# Patient Record
Sex: MALE | Race: WHITE | URBAN - METROPOLITAN AREA
[De-identification: names, ages, dates, MRNs, and addresses within clinical notes are randomized per-mention and may not be internally consistent; named-entity substitution may affect disease eponyms.]

---

## 2017-01-04 ENCOUNTER — AMBUL SURGICAL CARE (OUTPATIENT)
Dept: URBAN - METROPOLITAN AREA SURGERY 32 | Facility: SURGERY | Age: 72
Setting detail: OPHTHALMOLOGY
End: 2017-01-04
Payer: COMMERCIAL

## 2017-01-04 DIAGNOSIS — H25.041: ICD-10-CM

## 2017-01-04 DIAGNOSIS — H25.11: ICD-10-CM

## 2017-01-04 PROCEDURE — 66984 XCAPSL CTRC RMVL W/O ECP: CPT | Performed by: OPHTHALMOLOGY

## 2017-01-04 PROCEDURE — G8918 PT W/O PREOP ORDER IV AB PRO: HCPCS | Performed by: OPHTHALMOLOGY

## 2017-01-04 PROCEDURE — G8907 PT DOC NO EVENTS ON DISCHARG: HCPCS | Performed by: OPHTHALMOLOGY

## 2017-01-05 ENCOUNTER — RX ONLY (RX ONLY)
Age: 72
End: 2017-01-05

## 2017-01-05 ENCOUNTER — DOCTOR'S OFFICE (OUTPATIENT)
Dept: URBAN - METROPOLITAN AREA CLINIC 136 | Facility: CLINIC | Age: 72
Setting detail: OPHTHALMOLOGY
End: 2017-01-05
Payer: COMMERCIAL

## 2017-01-05 DIAGNOSIS — H25.013: ICD-10-CM

## 2017-01-05 PROCEDURE — 99024 POSTOP FOLLOW-UP VISIT: CPT | Performed by: OPHTHALMOLOGY

## 2017-01-05 ASSESSMENT — REFRACTION_MANIFEST
OU_VA: 20/
OU_VA: 20/
OS_VA3: 20/
OD_VA1: 20/
OS_VA3: 20/
OS_VA3: 20/
OU_VA: 20/
OS_VA1: 20/
OD_VA1: 20/
OS_VA1: 20/
OD_VA1: 20/
OD_VA2: 20/
OS_VA2: 20/
OD_VA3: 20/
OD_VA3: 20/
OS_VA2: 20/
OS_VA2: 20/
OS_VA1: 20/
OD_VA3: 20/
OD_VA2: 20/
OD_VA2: 20/

## 2017-01-05 ASSESSMENT — REFRACTION_CURRENTRX
OS_VPRISM_DIRECTION: PROGS
OD_OVR_VA: 20/
OS_OVR_VA: 20/
OS_SPHERE: -10.00
OD_AXIS: 068
OD_OVR_VA: 20/
OS_AXIS: 137
OS_OVR_VA: 20/
OD_VPRISM_DIRECTION: PROGS
OS_OVR_VA: 20/
OD_ADD: +2.50
OS_CYLINDER: -1.50
OD_OVR_VA: 20/
OD_SPHERE: -8.00
OS_ADD: +2.50
OD_CYLINDER: -0.50

## 2017-01-05 ASSESSMENT — REFRACTION_AUTOREFRACTION
OD_CYLINDER: -0.50
OS_AXIS: 017
OS_CYLINDER: -2.50
OS_SPHERE: -10.00
OD_AXIS: 118
OD_SPHERE: -0.50

## 2017-01-05 ASSESSMENT — SPHEQUIV_DERIVED
OD_SPHEQUIV: -0.75
OS_SPHEQUIV: -11.25

## 2017-01-05 ASSESSMENT — VISUAL ACUITY
OS_BCVA: 20/25+1
OD_BCVA: 20/30-2

## 2017-01-05 ASSESSMENT — KERATOMETRY
OD_AXISANGLE_DEGREES: 178
OD_K2POWER_DIOPTERS: 45.25
OS_K1POWER_DIOPTERS: 44.50
OS_K2POWER_DIOPTERS: 45.25
OS_AXISANGLE_DEGREES: 046
OD_K1POWER_DIOPTERS: 44.25

## 2017-01-05 ASSESSMENT — AXIALLENGTH_DERIVED
OS_AL: 28.2328
OD_AL: 23.4261

## 2017-01-11 ENCOUNTER — AMBUL SURGICAL CARE (OUTPATIENT)
Dept: URBAN - METROPOLITAN AREA SURGERY 32 | Facility: SURGERY | Age: 72
Setting detail: OPHTHALMOLOGY
End: 2017-01-11
Payer: COMMERCIAL

## 2017-01-11 ENCOUNTER — DOCTOR'S OFFICE (OUTPATIENT)
Dept: URBAN - METROPOLITAN AREA CLINIC 136 | Facility: CLINIC | Age: 72
Setting detail: OPHTHALMOLOGY
End: 2017-01-11
Payer: COMMERCIAL

## 2017-01-11 DIAGNOSIS — H25.12: ICD-10-CM

## 2017-01-11 DIAGNOSIS — H25.042: ICD-10-CM

## 2017-01-11 PROCEDURE — 92136 OPHTHALMIC BIOMETRY: CPT | Performed by: OPHTHALMOLOGY

## 2017-01-11 PROCEDURE — G8907 PT DOC NO EVENTS ON DISCHARG: HCPCS | Performed by: OPHTHALMOLOGY

## 2017-01-11 PROCEDURE — 66984 XCAPSL CTRC RMVL W/O ECP: CPT | Performed by: OPHTHALMOLOGY

## 2017-01-11 PROCEDURE — G8918 PT W/O PREOP ORDER IV AB PRO: HCPCS | Performed by: OPHTHALMOLOGY

## 2017-01-12 ENCOUNTER — DOCTOR'S OFFICE (OUTPATIENT)
Dept: URBAN - METROPOLITAN AREA CLINIC 136 | Facility: CLINIC | Age: 72
Setting detail: OPHTHALMOLOGY
End: 2017-01-12

## 2017-01-12 DIAGNOSIS — Z96.1: ICD-10-CM

## 2017-01-12 PROBLEM — H25.013 CATARACT; BOTH EYES: Status: RESOLVED | Noted: 2017-01-12 | Resolved: 2017-01-12

## 2017-01-12 PROCEDURE — 99024 POSTOP FOLLOW-UP VISIT: CPT | Performed by: OPTOMETRIST

## 2017-01-12 ASSESSMENT — CONFRONTATIONAL VISUAL FIELD TEST (CVF)
OS_FINDINGS: FULL
OD_FINDINGS: FULL

## 2017-01-12 ASSESSMENT — AXIALLENGTH_DERIVED
OD_AL: 23.5226
OS_AL: 23.4289

## 2017-01-12 ASSESSMENT — REFRACTION_MANIFEST
OD_VA2: 20/
OS_VA3: 20/
OS_VA3: 20/
OU_VA: 20/
OS_VA2: 20/
OU_VA: 20/
OS_VA1: 20/
OS_VA1: 20/
OD_VA3: 20/
OS_VA1: 20/
OD_VA3: 20/
OS_VA2: 20/
OD_VA2: 20/
OD_VA1: 20/
OD_VA2: 20/
OS_VA3: 20/
OU_VA: 20/
OD_VA1: 20/
OS_VA2: 20/
OD_VA1: 20/
OD_VA3: 20/

## 2017-01-12 ASSESSMENT — REFRACTION_CURRENTRX
OS_SPHERE: -10.00
OD_VPRISM_DIRECTION: PROGS
OD_AXIS: 068
OD_ADD: +2.50
OD_SPHERE: -8.00
OS_ADD: +2.50
OD_OVR_VA: 20/
OS_CYLINDER: -1.50
OS_AXIS: 137
OS_VPRISM_DIRECTION: PROGS
OS_OVR_VA: 20/
OS_OVR_VA: 20/
OD_CYLINDER: -0.50
OD_OVR_VA: 20/
OD_OVR_VA: 20/
OS_OVR_VA: 20/

## 2017-01-12 ASSESSMENT — SPHEQUIV_DERIVED
OS_SPHEQUIV: -0.875
OD_SPHEQUIV: -1

## 2017-01-12 ASSESSMENT — KERATOMETRY
OS_AXISANGLE_DEGREES: 046
OS_K2POWER_DIOPTERS: 45.25
OS_K1POWER_DIOPTERS: 44.50
OD_K1POWER_DIOPTERS: 44.25
OD_AXISANGLE_DEGREES: 178
OD_K2POWER_DIOPTERS: 45.25

## 2017-01-12 ASSESSMENT — REFRACTION_AUTOREFRACTION
OS_AXIS: 035
OD_CYLINDER: -1.00
OS_CYLINDER: -1.25
OD_SPHERE: -0.50
OS_SPHERE: -0.25
OD_AXIS: 132

## 2017-01-12 ASSESSMENT — VISUAL ACUITY
OD_BCVA: 20/30-2
OS_BCVA: 20/30+2

## 2017-01-19 ENCOUNTER — RX ONLY (RX ONLY)
Age: 72
End: 2017-01-19

## 2017-01-19 ENCOUNTER — DOCTOR'S OFFICE (OUTPATIENT)
Dept: URBAN - METROPOLITAN AREA CLINIC 136 | Facility: CLINIC | Age: 72
Setting detail: OPHTHALMOLOGY
End: 2017-01-19

## 2017-01-19 DIAGNOSIS — Z96.1: ICD-10-CM

## 2017-01-19 PROCEDURE — 99024 POSTOP FOLLOW-UP VISIT: CPT | Performed by: OPTOMETRIST

## 2017-01-19 ASSESSMENT — VISUAL ACUITY
OD_BCVA: 20/25-2
OS_BCVA: 20/20-1

## 2017-01-19 ASSESSMENT — REFRACTION_MANIFEST
OS_VA1: 20/
OD_VA3: 20/
OD_VA3: 20/
OS_VA2: 20/
OD_VA2: 20/
OU_VA: 20/
OS_VA2: 20/
OS_VA1: 20/
OD_VA1: 20/
OS_VA3: 20/
OD_VA3: 20/
OS_VA3: 20/
OD_VA1: 20/
OU_VA: 20/
OD_VA2: 20/
OU_VA: 20/
OD_VA1: 20/
OD_VA2: 20/
OS_VA1: 20/
OS_VA3: 20/
OS_VA2: 20/

## 2017-01-19 ASSESSMENT — REFRACTION_AUTOREFRACTION
OS_CYLINDER: -1.25
OD_CYLINDER: -1.00
OD_AXIS: 132
OS_AXIS: 035
OD_SPHERE: -0.50
OS_SPHERE: -0.25

## 2017-01-19 ASSESSMENT — REFRACTION_CURRENTRX
OD_SPHERE: -8.00
OD_VPRISM_DIRECTION: PROGS
OS_AXIS: 137
OS_OVR_VA: 20/
OS_CYLINDER: -1.50
OD_OVR_VA: 20/
OD_OVR_VA: 20/
OD_AXIS: 068
OD_ADD: +2.50
OD_CYLINDER: -0.50
OS_ADD: +2.50
OS_OVR_VA: 20/
OD_OVR_VA: 20/
OS_VPRISM_DIRECTION: PROGS
OS_SPHERE: -10.00
OS_OVR_VA: 20/

## 2017-01-19 ASSESSMENT — SPHEQUIV_DERIVED
OD_SPHEQUIV: -1
OS_SPHEQUIV: -0.875

## 2017-02-09 ENCOUNTER — DOCTOR'S OFFICE (OUTPATIENT)
Dept: URBAN - METROPOLITAN AREA CLINIC 136 | Facility: CLINIC | Age: 72
Setting detail: OPHTHALMOLOGY
End: 2017-02-09

## 2017-02-09 DIAGNOSIS — Z96.1: ICD-10-CM

## 2017-02-09 DIAGNOSIS — H52.4: ICD-10-CM

## 2017-02-09 PROCEDURE — 99024 POSTOP FOLLOW-UP VISIT: CPT | Performed by: OPTOMETRIST

## 2017-02-09 PROCEDURE — 92015 DETERMINE REFRACTIVE STATE: CPT | Performed by: OPTOMETRIST

## 2017-02-09 ASSESSMENT — REFRACTION_CURRENTRX
OS_OVR_VA: 20/
OD_CYLINDER: -0.50
OD_AXIS: 068
OS_SPHERE: +2.00
OS_AXIS: 137
OS_ADD: +2.50
OD_OVR_VA: 20/
OD_ADD: +2.50
OD_SPHERE: +2.00
OD_OVR_VA: 20/
OS_OVR_VA: 20/
OD_OVR_VA: 20/
OS_VPRISM_DIRECTION: PROGS
OD_SPHERE: -8.00
OS_SPHERE: -10.00
OS_OVR_VA: 20/
OD_VPRISM_DIRECTION: PROGS
OS_CYLINDER: -1.50

## 2017-02-09 ASSESSMENT — REFRACTION_AUTOREFRACTION
OD_AXIS: 132
OS_CYLINDER: -1.25
OS_AXIS: 035
OD_SPHERE: -0.50
OS_SPHERE: -0.25
OD_CYLINDER: -1.00

## 2017-02-09 ASSESSMENT — REFRACTION_OUTSIDERX
OS_VA3: 20/
OS_AXIS: 070
OD_VA1: 20/20
OS_SPHERE: -0.50
OU_VA: 20/20
OD_CYLINDER: -0.25
OD_VA2: 20/20
OD_ADD: +2.50
OD_AXIS: 015
OD_SPHERE: -0.50
OS_ADD: +2.50
OD_VA3: 20/
OS_CYLINDER: -0.50
OS_VA2: 20/
OS_VA1: 20/20

## 2017-02-09 ASSESSMENT — REFRACTION_MANIFEST
OD_VA3: 20/
OD_VA2: 20/
OS_VA1: 20/
OD_VA1: 20/
OD_VA2: 20/
OS_VA3: 20/
OD_VA3: 20/
OD_VA1: 20/
OS_VA3: 20/
OU_VA: 20/
OU_VA: 20/
OS_VA2: 20/
OS_VA1: 20/
OS_VA2: 20/

## 2017-02-09 ASSESSMENT — SPHEQUIV_DERIVED
OS_SPHEQUIV: -0.875
OD_SPHEQUIV: -1

## 2017-02-09 ASSESSMENT — VISUAL ACUITY
OS_BCVA: 20/20-1
OD_BCVA: 20/25-2

## 2017-02-20 ENCOUNTER — OPTICAL OFFICE (OUTPATIENT)
Dept: URBAN - METROPOLITAN AREA CLINIC 143 | Facility: CLINIC | Age: 72
Setting detail: OPHTHALMOLOGY
End: 2017-02-20
Payer: COMMERCIAL

## 2017-02-20 DIAGNOSIS — Z96.1: ICD-10-CM

## 2017-02-20 PROCEDURE — V2750 ANTI-REFLECTIVE COATING: HCPCS | Performed by: OPTOMETRIST

## 2017-02-20 PROCEDURE — V2103 SPHEROCYLINDR 4.00D/12-2.00D: HCPCS | Performed by: OPTOMETRIST

## 2017-02-20 PROCEDURE — V2020 VISION SVCS FRAMES PURCHASES: HCPCS | Performed by: OPTOMETRIST

## 2017-12-01 ENCOUNTER — DOCTOR'S OFFICE (OUTPATIENT)
Dept: URBAN - METROPOLITAN AREA CLINIC 136 | Facility: CLINIC | Age: 72
Setting detail: OPHTHALMOLOGY
End: 2017-12-01
Payer: COMMERCIAL

## 2017-12-01 DIAGNOSIS — H43.812: ICD-10-CM

## 2017-12-01 DIAGNOSIS — Z96.1: ICD-10-CM

## 2017-12-01 DIAGNOSIS — H02.825: ICD-10-CM

## 2017-12-01 PROCEDURE — 92014 COMPRE OPH EXAM EST PT 1/>: CPT | Performed by: OPTOMETRIST

## 2017-12-01 ASSESSMENT — REFRACTION_CURRENTRX
OD_OVR_VA: 20/
OS_AXIS: 137
OS_VPRISM_DIRECTION: PROGS
OD_VPRISM_DIRECTION: PROGS
OD_OVR_VA: 20/
OS_OVR_VA: 20/
OS_ADD: +2.50
OS_OVR_VA: 20/
OS_OVR_VA: 20/
OD_CYLINDER: -0.50
OS_SPHERE: -10.00
OS_CYLINDER: -1.50
OD_ADD: +2.50
OD_AXIS: 068
OD_SPHERE: +2.00
OD_OVR_VA: 20/
OS_SPHERE: +2.00
OD_SPHERE: -8.00

## 2017-12-01 ASSESSMENT — REFRACTION_OUTSIDERX
OS_SPHERE: -0.50
OD_AXIS: 015
OD_ADD: +2.50
OS_ADD: +2.50
OS_VA3: 20/
OS_CYLINDER: -0.50
OD_VA1: 20/20
OD_VA3: 20/
OD_SPHERE: -0.50
OU_VA: 20/20
OS_AXIS: 070
OD_VA2: 20/20
OD_CYLINDER: -0.25
OS_VA1: 20/20
OS_VA2: 20/

## 2017-12-01 ASSESSMENT — REFRACTION_AUTOREFRACTION
OS_SPHERE: -0.25
OS_AXIS: 035
OD_CYLINDER: -1.00
OD_SPHERE: -0.50
OD_AXIS: 132
OS_CYLINDER: -1.25

## 2017-12-01 ASSESSMENT — REFRACTION_MANIFEST
OD_VA2: 20/
OD_VA3: 20/
OD_VA3: 20/
OS_VA1: 20/
OD_VA2: 20/
OU_VA: 20/
OS_VA1: 20/
OS_VA3: 20/
OD_VA1: 20/
OS_VA2: 20/
OS_VA2: 20/
OD_VA1: 20/
OU_VA: 20/
OS_VA3: 20/

## 2017-12-01 ASSESSMENT — VISUAL ACUITY
OD_BCVA: 20/25
OS_BCVA: 20/25-2

## 2017-12-01 ASSESSMENT — SPHEQUIV_DERIVED
OD_SPHEQUIV: -1
OS_SPHEQUIV: -0.875

## 2017-12-01 ASSESSMENT — CONFRONTATIONAL VISUAL FIELD TEST (CVF)
OD_FINDINGS: FULL
OS_FINDINGS: FULL

## 2018-12-03 ENCOUNTER — DOCTOR'S OFFICE (OUTPATIENT)
Dept: URBAN - METROPOLITAN AREA CLINIC 136 | Facility: CLINIC | Age: 73
Setting detail: OPHTHALMOLOGY
End: 2018-12-03
Payer: COMMERCIAL

## 2018-12-03 DIAGNOSIS — H52.223: ICD-10-CM

## 2018-12-03 DIAGNOSIS — H43.812: ICD-10-CM

## 2018-12-03 DIAGNOSIS — H52.13: ICD-10-CM

## 2018-12-03 DIAGNOSIS — H52.4: ICD-10-CM

## 2018-12-03 DIAGNOSIS — Z96.1: ICD-10-CM

## 2018-12-03 DIAGNOSIS — H02.821: ICD-10-CM

## 2018-12-03 PROCEDURE — 92014 COMPRE OPH EXAM EST PT 1/>: CPT | Performed by: OPTOMETRIST

## 2018-12-03 ASSESSMENT — REFRACTION_MANIFEST
OS_VA2: 20/20
OD_VA3: 20/
OS_SPHERE: -0.50
OS_VA1: 20/20
OS_VA3: 20/
OS_CYLINDER: -0.50
OD_VA2: 20/20
OD_SPHERE: -0.50
OU_VA: 20/
OD_VA1: 20/20
OS_VA3: 20/
OD_VA1: 20/
OD_ADD: +2.50
OD_VA2: 20/
OD_CYLINDER: SPH
OS_ADD: +2.50
OS_VA2: 20/
OU_VA: 20/20
OS_VA1: 20/
OS_AXIS: 070
OD_VA3: 20/

## 2018-12-03 ASSESSMENT — KERATOMETRY
OS_K1POWER_DIOPTERS: 44.50
OS_AXISANGLE_DEGREES: 046
OS_K2POWER_DIOPTERS: 45.25
OD_K1POWER_DIOPTERS: 44.25
OD_AXISANGLE_DEGREES: 178
OD_K2POWER_DIOPTERS: 45.25

## 2018-12-03 ASSESSMENT — REFRACTION_AUTOREFRACTION
OD_SPHERE: -0.25
OS_SPHERE: -0.25
OS_AXIS: 035
OD_CYLINDER: -1.75
OD_AXIS: 029
OS_CYLINDER: -1.25

## 2018-12-03 ASSESSMENT — REFRACTION_CURRENTRX
OD_SPHERE: -8.00
OD_OVR_VA: 20/
OS_SPHERE: +2.00
OS_SPHERE: +2.50
OS_ADD: +2.50
OS_VPRISM_DIRECTION: PROGS
OS_OVR_VA: 20/
OD_VPRISM_DIRECTION: PROGS
OS_CYLINDER: -1.50
OS_OVR_VA: 20/
OD_OVR_VA: 20/
OS_SPHERE: -10.00
OD_AXIS: 068
OD_OVR_VA: 20/
OS_OVR_VA: 20/
OD_ADD: +2.50
OS_AXIS: 137
OD_CYLINDER: -0.50
OD_SPHERE: +2.00
OD_SPHERE: +2.50

## 2018-12-03 ASSESSMENT — CONFRONTATIONAL VISUAL FIELD TEST (CVF)
OS_FINDINGS: FULL
OD_FINDINGS: FULL

## 2018-12-03 ASSESSMENT — AXIALLENGTH_DERIVED
OS_AL: 23.4289
OD_AL: 23.5711
OS_AL: 23.381

## 2018-12-03 ASSESSMENT — SPHEQUIV_DERIVED
OD_SPHEQUIV: -1.125
OS_SPHEQUIV: -0.875
OS_SPHEQUIV: -0.75

## 2018-12-03 ASSESSMENT — LID EXAM ASSESSMENTS
OS_BLEPHARITIS: LLL LUL 2+
OD_BLEPHARITIS: RLL RUL 2+

## 2018-12-03 ASSESSMENT — VISUAL ACUITY
OS_BCVA: 20/25+2
OD_BCVA: 20/25-1

## 2020-03-17 ENCOUNTER — TELEPHONE (OUTPATIENT)
Dept: UROLOGY | Facility: MEDICAL CENTER | Age: 75
End: 2020-03-17

## 2020-03-17 NOTE — TELEPHONE ENCOUNTER
Called pt to aline appt with Dr Gen Coffey in [facility] from 3/19/2020  Left message for pt to call back and aline  Told pt he could  be seen by Thu Klein or Shreya Aceves here in [facility]  Pt lives in [place]

## 2020-06-04 ENCOUNTER — TELEMEDICINE (OUTPATIENT)
Dept: UROLOGY | Facility: MEDICAL CENTER | Age: 75
End: 2020-06-04
Payer: COMMERCIAL

## 2020-06-04 DIAGNOSIS — N13.8 BPH WITH OBSTRUCTION/LOWER URINARY TRACT SYMPTOMS: Primary | ICD-10-CM

## 2020-06-04 DIAGNOSIS — R35.1 NOCTURIA: ICD-10-CM

## 2020-06-04 DIAGNOSIS — N40.1 BPH WITH OBSTRUCTION/LOWER URINARY TRACT SYMPTOMS: Primary | ICD-10-CM

## 2020-06-04 PROCEDURE — 99213 OFFICE O/P EST LOW 20 MIN: CPT | Performed by: UROLOGY

## 2020-07-09 ENCOUNTER — TELEPHONE (OUTPATIENT)
Dept: UROLOGY | Facility: MEDICAL CENTER | Age: 75
End: 2020-07-09

## 2020-07-09 NOTE — TELEPHONE ENCOUNTER
Patient is coming in with Bj Rodas for [address] 34 w/u  There was a note from last office visit with Dr Johnson stating he wanted a renal US done prior  Please advise wether this should still be done and if so we would need it ordered in his chart to schedule

## 2020-07-10 DIAGNOSIS — N40.1 BPH WITH OBSTRUCTION/LOWER URINARY TRACT SYMPTOMS: Primary | ICD-10-CM

## 2020-07-10 DIAGNOSIS — N13.8 BPH WITH OBSTRUCTION/LOWER URINARY TRACT SYMPTOMS: Primary | ICD-10-CM

## 2020-07-10 NOTE — TELEPHONE ENCOUNTER
Call placed to patient who is aware of recommendations of Dr Asya Duran at this time and will contact [facility] in regards to scheduling this appointment

## 2020-07-14 ENCOUNTER — HOSPITAL ENCOUNTER (OUTPATIENT)
Dept: ULTRASOUND IMAGING | Facility: HOSPITAL | Age: 75
Discharge: HOME | End: 2020-07-14
Attending: UROLOGY
Payer: COMMERCIAL

## 2020-07-14 DIAGNOSIS — N40.1 BPH WITH OBSTRUCTION/LOWER URINARY TRACT SYMPTOMS: ICD-10-CM

## 2020-07-14 DIAGNOSIS — N13.8 BPH WITH OBSTRUCTION/LOWER URINARY TRACT SYMPTOMS: ICD-10-CM

## 2020-07-14 PROCEDURE — 76770 US EXAM ABDO BACK WALL COMP: CPT

## 2020-07-14 RX ORDER — VENLAFAXINE HYDROCHLORIDE 150 MG/1
150 CAPSULE, EXTENDED RELEASE ORAL DAILY
COMMUNITY
Start: 2020-05-30

## 2020-07-14 RX ORDER — LOSARTAN POTASSIUM 100 MG/1
TABLET ORAL
COMMUNITY
Start: 2020-06-29

## 2020-07-14 RX ORDER — TAMSULOSIN HYDROCHLORIDE 0.4 MG/1
CAPSULE ORAL
COMMUNITY
Start: 2020-05-18 | End: 2020-09-24

## 2020-07-16 ENCOUNTER — PROCEDURE VISIT (OUTPATIENT)
Dept: UROLOGY | Facility: MEDICAL CENTER | Age: 75
End: 2020-07-16
Payer: COMMERCIAL

## 2020-07-16 ENCOUNTER — TELEPHONE (OUTPATIENT)
Dept: UROLOGY | Facility: MEDICAL CENTER | Age: 75
End: 2020-07-16

## 2020-07-16 VITALS — TEMPERATURE: 97.3 F | WEIGHT: 120 LBS | BODY MASS INDEX: 17.77 KG/M2 | HEIGHT: 69 IN

## 2020-07-16 DIAGNOSIS — N13.8 BPH WITH OBSTRUCTION/LOWER URINARY TRACT SYMPTOMS: Primary | ICD-10-CM

## 2020-07-16 DIAGNOSIS — N52.1 ERECTILE DYSFUNCTION DUE TO DISEASES CLASSIFIED ELSEWHERE: ICD-10-CM

## 2020-07-16 DIAGNOSIS — N40.1 BPH WITH OBSTRUCTION/LOWER URINARY TRACT SYMPTOMS: Primary | ICD-10-CM

## 2020-07-16 LAB
SL AMB  POCT GLUCOSE, UA: NORMAL
SL AMB LEUKOCYTE ESTERASE,UA: NORMAL
SL AMB POCT BILIRUBIN,UA: NORMAL
SL AMB POCT BLOOD,UA: NORMAL
SL AMB POCT CLARITY,UA: CLEAR
SL AMB POCT COLOR,UA: YELLOW
SL AMB POCT KETONES,UA: NORMAL
SL AMB POCT NITRITE,UA: NORMAL
SL AMB POCT PH,UA: 5.5
SL AMB POCT SPECIFIC GRAVITY,UA: 1.02
SL AMB POCT URINE PROTEIN: NORMAL
SL AMB POCT UROBILINOGEN: 0.2

## 2020-07-16 PROCEDURE — 81003 URINALYSIS AUTO W/O SCOPE: CPT | Performed by: UROLOGY

## 2020-07-16 PROCEDURE — 99214 OFFICE O/P EST MOD 30 MIN: CPT | Performed by: UROLOGY

## 2020-07-16 PROCEDURE — 76872 US TRANSRECTAL: CPT | Performed by: UROLOGY

## 2020-07-16 PROCEDURE — 51741 ELECTRO-UROFLOWMETRY FIRST: CPT | Performed by: UROLOGY

## 2020-07-16 PROCEDURE — 52000 CYSTOURETHROSCOPY: CPT | Performed by: UROLOGY

## 2020-07-16 RX ORDER — SULFAMETHOXAZOLE AND TRIMETHOPRIM 800; 160 MG/1; MG/1
1 TABLET ORAL EVERY 12 HOURS SCHEDULED
Qty: 4 TABLET | Refills: 0 | Status: SHIPPED | OUTPATIENT
Start: 2020-07-16 | End: 2020-07-16

## 2020-07-16 RX ORDER — SULFAMETHOXAZOLE AND TRIMETHOPRIM 800; 160 MG/1; MG/1
1 TABLET ORAL EVERY 12 HOURS SCHEDULED
Qty: 4 TABLET | Refills: 0 | Status: SHIPPED | OUTPATIENT
Start: 2020-07-16 | End: 2020-07-18

## 2020-07-16 NOTE — LETTER
July 16, 2020     Ritchie Hannah, [address]    Patient: Robert Nova   YOB: 1945   Date of Visit: 7/16/2020       Dear Dr Meghann New:    Thank you for referring Robert Pate to me for evaluation  Below are my notes for this consultation  If you have questions, please do not hesitate to call me  I look forward to following your patient along with you  Sincerely,        Jameel Torres MD        CC: No Recipients  Jameel Torres MD  7/16/2020  9:33 AM  Sign at close encounter  Uroflow  Date/Time: 7/16/2020 9:14 AM  Performed by: Jameel Torres MD  Authorized by: Jameel Torres MD   Procedure - Urodynamics:  Procedure details: Simple Uroflow          Post-procedure:     Patient tolerance: Patient tolerated procedure well with no immediate complications              Jameel Torres MD  7/16/2020  9:36 AM  Sign at close encounter  Cystoscopy  Date/Time: 7/16/2020 9:14 AM  Performed by: Jameel Torres MD  Authorized by: Jameel Torres MD     Procedure details: cystoscopy    Patient tolerance: Patient tolerated the procedure well with no immediate complications    Additional Procedure Details: The patient was placed supine on the examining table and after prepping the urethral meatus with Betadine 2% lidocaine lubricant was inserted in the urethral lumen and left indwelling for 5 minutes  Flexible cystourethroscopy took place thereafter revealing a normal anterior urethra  There was significant bilobar enlargement of the lateral lobes of the prostate with visual occlusion of the bladder outlet  4-6 Uro lift implants would be appropriate for this prostate  There was not significant evidence of a prostatic median lobe  The urinary bladder was severely trabeculated with posterior wall cellules and diverticular formation bilaterally    Ureteral orifices were normal position and configuration bilaterally with clear efflux bilaterally  There were no intrinsic lesions of the bladder no evidence of extrinsic mass compression of the bladde  The bladder was left full and the patient was asked to void into the uroflow  Refer to the dictation for uroflow and PVR  The patient return to the cystoscopy suite after performing uroflow  He was then placed in the right flank up position and the transrectal condom covered lubricated ultrasound probe was placed per anus into the rectal vault  Sagittal and longitudinal imaging of the prostate and seminal vesicles took place revealing normal seminal vesicles bilaterally  No hypoechoic peripheral zone lesions were identified  There was some peripheral zone heterogeneity without focal lesion  Periurethral calcifications were present  There was evidence of bilateral adenoma  Transverse diameter the prostate was measured at    mm with prostatic volume estimated at     Cc  At the end of the procedure the probe was removed atraumatically and the patient recovered uneventfully and left the office in stable condition

## 2020-07-16 NOTE — PROGRESS NOTES
Cystoscopy  Date/Time: 7/16/2020 9:14 AM  Performed by: Salome Galloway MD  Authorized by: Salome Galloway MD     Procedure details: cystoscopy    Patient tolerance: Patient tolerated the procedure well with no immediate complications    Additional Procedure Details: The patient was placed supine on the examining table and after prepping the urethral meatus with Betadine 2% lidocaine lubricant was inserted in the urethral lumen and left indwelling for 5 minutes  Flexible cystourethroscopy took place thereafter revealing a normal anterior urethra  There was significant bilobar enlargement of the lateral lobes of the prostate with visual occlusion of the bladder outlet  4-6 Uro lift implants would be appropriate for this prostate  There was not significant evidence of a prostatic median lobe  The urinary bladder was severely trabeculated with posterior wall cellules and diverticular formation bilaterally  Ureteral orifices were normal position and configuration bilaterally with clear efflux bilaterally  There were no intrinsic lesions of the bladder no evidence of extrinsic mass compression of the bladde  The bladder was left full and the patient was asked to void into the uroflow  Refer to the dictation for uroflow and PVR  The patient return to the cystoscopy suite after performing uroflow  He was then placed in the right flank up position and the transrectal condom covered lubricated ultrasound probe was placed per anus into the rectal vault  Sagittal and longitudinal imaging of the prostate and seminal vesicles took place revealing normal seminal vesicles bilaterally  No hypoechoic peripheral zone lesions were identified  There was some peripheral zone heterogeneity without focal lesion  Periurethral calcifications were present  There was evidence of bilateral adenoma  Transverse diameter the prostate was measured at    48mm with prostatic volume estimated at 37    Cc    At the end of the procedure the probe was removed atraumatically and the patient recovered uneventfully and left the office in stable condition

## 2020-07-16 NOTE — H&P (VIEW-ONLY)
Cystoscopy  Date/Time: 7/16/2020 9:14 AM  Performed by: Kayla Orozco MD  Authorized by: Kayla Orozco MD     Procedure details: cystoscopy    Patient tolerance: Patient tolerated the procedure well with no immediate complications    Additional Procedure Details: The patient was placed supine on the examining table and after prepping the urethral meatus with Betadine 2% lidocaine lubricant was inserted in the urethral lumen and left indwelling for 5 minutes  Flexible cystourethroscopy took place thereafter revealing a normal anterior urethra  There was significant bilobar enlargement of the lateral lobes of the prostate with visual occlusion of the bladder outlet  4-6 Uro lift implants would be appropriate for this prostate  There was not significant evidence of a prostatic median lobe  The urinary bladder was severely trabeculated with posterior wall cellules and diverticular formation bilaterally  Ureteral orifices were normal position and configuration bilaterally with clear efflux bilaterally  There were no intrinsic lesions of the bladder no evidence of extrinsic mass compression of the bladde  The bladder was left full and the patient was asked to void into the uroflow  Refer to the dictation for uroflow and PVR  The patient return to the cystoscopy suite after performing uroflow  He was then placed in the right flank up position and the transrectal condom covered lubricated ultrasound probe was placed per anus into the rectal vault  Sagittal and longitudinal imaging of the prostate and seminal vesicles took place revealing normal seminal vesicles bilaterally  No hypoechoic peripheral zone lesions were identified  There was some peripheral zone heterogeneity without focal lesion  Periurethral calcifications were present  There was evidence of bilateral adenoma  Transverse diameter the prostate was measured at    48mm with prostatic volume estimated at 37    Cc    At the end of the procedure the probe was removed atraumatically and the patient recovered uneventfully and left the office in stable condition

## 2020-07-16 NOTE — TELEPHONE ENCOUNTER
I spoke to the patient to schedule his Urolift with Dr Teresa Vincent 8/7/2020 at [facility]      -instructions given verbally and mailed  -CBC, CMP, CPVID, Urine C&S and EKG 10 days prior  -[facility] -

## 2020-07-16 NOTE — PROGRESS NOTES
H&P Exam - Urology   Robert Pate 66 y o  male MRN: [id number]  Unit/Bed#:  Encounter: [id number]    Assessment/Plan     Assessment:  BPH with lower near E tract symptoms  Erectile dysfunction  Plan:  Cystoscopy insertion of Uro lift implants    History of Present Illness   HPI:  August Holli Severance is a 66 y o  male who presents with an AUA symptom score of 25  Specifically with 5/5 weakening of the urinary stream, 4/5 for frequency intermittency straining to urinate with 4 times per night nocturia and 3/5 incomplete bladder emptying  The patient had this symptom complex after being placed on tamsulosin 0 4 mg p o  daily and the patient notes no positive affect from pharmacologic therapy  The patient had a uroflow with a average flow rate of approximately 1 cc/second with a maximum flow of only 3 2 cc/second  Prostate size was 37 cc with a transverse diameter of 48 mm  The patient does not wish to continue alpha blockade if he does not have to  He discussed TURP as well as Uro lift and laser TURP in detail and he is aware the potential for bleeding infection urinary retention urinary incontinence urgency frequency or exacerbation of irritative or obstructive symptoms with Uro lift  He understands that other procedures may need to be performed  The patient however wishes to proceed with Uro lift hoping that this will improve his stream   On cystoscopy he had bilobar enlargement of the lateral lobes of the prostate with visual occlusion to the bladder outlet and a moderately to severely trabeculated diverticular laid urinary bladder  Patient agrees to [employer] lift       Review of Systems   Genitourinary:        See HPI an AUA symptom score   All other systems reviewed and are negative  Historical Information   History reviewed  No pertinent past medical history  History reviewed  No pertinent surgical history    Social History   Social History     Substance and Sexual Activity   Alcohol Use Not on file     Social History     Substance and Sexual Activity   Drug Use Not on file     Social History     Tobacco Use   Smoking Status Not on file     Family History: History reviewed  No pertinent family history  Meds/Allergies   all medications and allergies reviewed  Allergies   Allergen Reactions    Lisinopril Cough       Objective   Vitals: Height 5' 9" (1 753 m), weight 54 4 kg (120 lb)  [unfilled]    Invasive Devices     None                 Physical Exam   Constitutional: He is oriented to person, place, and time  He appears well-developed and well-nourished  No distress  HENT:   Head: Normocephalic and atraumatic  Eyes: Pupils are equal, round, and reactive to light  EOM are normal    Neck: Normal range of motion  Neck supple  Cardiovascular: Normal rate, regular rhythm and normal heart sounds  Pulmonary/Chest: Effort normal and breath sounds normal  No respiratory distress  Abdominal: Soft  Bowel sounds are normal  He exhibits no distension  Genitourinary: Penis normal    Genitourinary Comments: MOMO normal anal verge normal anal sphincter tone no palpable rectal masses prostate approximately 35 g a nodular nontender   Musculoskeletal: Normal range of motion  Neurological: He is alert and oriented to person, place, and time  Skin: He is not diaphoretic  Psychiatric: He has a normal mood and affect  His behavior is normal  Judgment and thought content normal    Vitals reviewed  Lab Results: I have personally reviewed pertinent reports  Imaging: I have personally reviewed pertinent reports  EKG, Pathology, and Other Studies: I have personally reviewed pertinent reports  VTE Prophylaxis: Sequential compression device Juve Bagley)     Code Status: [unfilled]  Advance Directive and Living Will:      Power of :    POLST:      Counseling / Coordination of Care  Total floor / unit time spent today 45 minutes    Greater than 50% of total time was spent with the patient and / or family counseling and / or coordination of care  A description of the counseling / coordination of care: Keturah Ortega

## 2020-07-16 NOTE — H&P (VIEW-ONLY)
H&P Exam - Urology   Robert Pate 66 y o  male MRN: [id number]  Unit/Bed#:  Encounter: [id number]    Assessment/Plan     Assessment:  BPH with lower near E tract symptoms  Erectile dysfunction  Plan:  Cystoscopy insertion of Uro lift implants    History of Present Illness   HPI:  Robert Prather is a 66 y o  male who presents with an AUA symptom score of 25  Specifically with 5/5 weakening of the urinary stream, 4/5 for frequency intermittency straining to urinate with 4 times per night nocturia and 3/5 incomplete bladder emptying  The patient had this symptom complex after being placed on tamsulosin 0 4 mg p o  daily and the patient notes no positive affect from pharmacologic therapy  The patient had a uroflow with a average flow rate of approximately 1 cc/second with a maximum flow of only 3 2 cc/second  Prostate size was 37 cc with a transverse diameter of 48 mm  The patient does not wish to continue alpha blockade if he does not have to  He discussed TURP as well as Uro lift and laser TURP in detail and he is aware the potential for bleeding infection urinary retention urinary incontinence urgency frequency or exacerbation of irritative or obstructive symptoms with Uro lift  He understands that other procedures may need to be performed  The patient however wishes to proceed with Uro lift hoping that this will improve his stream   On cystoscopy he had bilobar enlargement of the lateral lobes of the prostate with visual occlusion to the bladder outlet and a moderately to severely trabeculated diverticular laid urinary bladder  Patient agrees to [employer] lift       Review of Systems   Genitourinary:        See HPI an AUA symptom score   All other systems reviewed and are negative  Historical Information   History reviewed  No pertinent past medical history  History reviewed  No pertinent surgical history    Social History   Social History     Substance and Sexual Activity   Alcohol Use Not on file     Social History     Substance and Sexual Activity   Drug Use Not on file     Social History     Tobacco Use   Smoking Status Not on file     Family History: History reviewed  No pertinent family history  Meds/Allergies   all medications and allergies reviewed  Allergies   Allergen Reactions    Lisinopril Cough       Objective   Vitals: Height 5' 9" (1 753 m), weight 54 4 kg (120 lb)  [unfilled]    Invasive Devices     None                 Physical Exam   Constitutional: He is oriented to person, place, and time  He appears well-developed and well-nourished  No distress  HENT:   Head: Normocephalic and atraumatic  Eyes: Pupils are equal, round, and reactive to light  EOM are normal    Neck: Normal range of motion  Neck supple  Cardiovascular: Normal rate, regular rhythm and normal heart sounds  Pulmonary/Chest: Effort normal and breath sounds normal  No respiratory distress  Abdominal: Soft  Bowel sounds are normal  He exhibits no distension  Genitourinary: Penis normal    Genitourinary Comments: MOMO normal anal verge normal anal sphincter tone no palpable rectal masses prostate approximately 35 g a nodular nontender   Musculoskeletal: Normal range of motion  Neurological: He is alert and oriented to person, place, and time  Skin: He is not diaphoretic  Psychiatric: He has a normal mood and affect  His behavior is normal  Judgment and thought content normal    Vitals reviewed  Lab Results: I have personally reviewed pertinent reports  Imaging: I have personally reviewed pertinent reports  EKG, Pathology, and Other Studies: I have personally reviewed pertinent reports  VTE Prophylaxis: Sequential compression device Diana Reyna)     Code Status: [unfilled]  Advance Directive and Living Will:      Power of :    POLST:      Counseling / Coordination of Care  Total floor / unit time spent today 45 minutes    Greater than 50% of total time was spent with the patient and / or family counseling and / or coordination of care  A description of the counseling / coordination of care: Jessi Sandoval

## 2020-07-16 NOTE — H&P
Cystoscopy  Date/Time: 7/16/2020 9:14 AM  Performed by: Oliver Licona MD  Authorized by: Oliver Licona MD     Procedure details: cystoscopy    Patient tolerance: Patient tolerated the procedure well with no immediate complications    Additional Procedure Details: The patient was placed supine on the examining table and after prepping the urethral meatus with Betadine 2% lidocaine lubricant was inserted in the urethral lumen and left indwelling for 5 minutes  Flexible cystourethroscopy took place thereafter revealing a normal anterior urethra  There was significant bilobar enlargement of the lateral lobes of the prostate with visual occlusion of the bladder outlet  4-6 Uro lift implants would be appropriate for this prostate  There was not significant evidence of a prostatic median lobe  The urinary bladder was severely trabeculated with posterior wall cellules and diverticular formation bilaterally  Ureteral orifices were normal position and configuration bilaterally with clear efflux bilaterally  There were no intrinsic lesions of the bladder no evidence of extrinsic mass compression of the bladde  The bladder was left full and the patient was asked to void into the uroflow  Refer to the dictation for uroflow and PVR  The patient return to the cystoscopy suite after performing uroflow  He was then placed in the right flank up position and the transrectal condom covered lubricated ultrasound probe was placed per anus into the rectal vault  Sagittal and longitudinal imaging of the prostate and seminal vesicles took place revealing normal seminal vesicles bilaterally  No hypoechoic peripheral zone lesions were identified  There was some peripheral zone heterogeneity without focal lesion  Periurethral calcifications were present  There was evidence of bilateral adenoma  Transverse diameter the prostate was measured at    48mm with prostatic volume estimated at 37    Cc    At the end of the procedure the probe was removed atraumatically and the patient recovered uneventfully and left the office in stable condition  Uroflow  Date/Time: 7/16/2020 9:14 AM  Performed by: Kayla Orozco MD  Authorized by: Kayla Orozco MD   Procedure - Urodynamics:  Procedure details: Simple Uroflow          Post-procedure:     Patient tolerance: Patient tolerated procedure well with no immediate complications      Comments: The patient only voided 61 cc with a PVR of 113 cc a maximum rate of 3 2 and average rate of less than 1 cc/second  Flow rates are sub normal and clinical correlation is recommended  H&P Exam - Urology   Robert Pate 66 y o  male MRN: [id number]  Unit/Bed#:  Encounter: [id number]    Assessment/Plan     Assessment:  BPH with lower near E tract symptoms  Erectile dysfunction  Plan:  Cystoscopy insertion of Uro lift implants    History of Present Illness   HPI:  Robert Braden is a 66 y o  male who presents with an AUA symptom score of 25  Specifically with 5/5 weakening of the urinary stream, 4/5 for frequency intermittency straining to urinate with 4 times per night nocturia and 3/5 incomplete bladder emptying  The patient had this symptom complex after being placed on tamsulosin 0 4 mg p o  daily and the patient notes no positive affect from pharmacologic therapy  The patient had a uroflow with a average flow rate of approximately 1 cc/second with a maximum flow of only 3 2 cc/second  Prostate size was 37 cc with a transverse diameter of 48 mm  The patient does not wish to continue alpha blockade if he does not have to  He discussed TURP as well as Uro lift and laser TURP in detail and he is aware the potential for bleeding infection urinary retention urinary incontinence urgency frequency or exacerbation of irritative or obstructive symptoms with Uro lift  He understands that other procedures may need to be performed    The patient however wishes to proceed with Uro lift hoping that this will improve his stream   On cystoscopy he had bilobar enlargement of the lateral lobes of the prostate with visual occlusion to the bladder outlet and a moderately to severely trabeculated diverticular laid urinary bladder  Patient agrees to [employer] lift       Review of Systems   Genitourinary:        See HPI an AUA symptom score   All other systems reviewed and are negative  Historical Information   History reviewed  No pertinent past medical history  History reviewed  No pertinent surgical history  Social History   Social History     Substance and Sexual Activity   Alcohol Use Not on file     Social History     Substance and Sexual Activity   Drug Use Not on file     Social History     Tobacco Use   Smoking Status Not on file     Family History: History reviewed  No pertinent family history  Meds/Allergies   all medications and allergies reviewed  Allergies   Allergen Reactions    Lisinopril Cough       Objective   Vitals: Height 5' 9" (1 753 m), weight 54 4 kg (120 lb)  [unfilled]    Invasive Devices     None                 Physical Exam   Constitutional: He is oriented to person, place, and time  He appears well-developed and well-nourished  No distress  HENT:   Head: Normocephalic and atraumatic  Eyes: Pupils are equal, round, and reactive to light  EOM are normal    Neck: Normal range of motion  Neck supple  Cardiovascular: Normal rate, regular rhythm and normal heart sounds  Pulmonary/Chest: Effort normal and breath sounds normal  No respiratory distress  Abdominal: Soft  Bowel sounds are normal  He exhibits no distension  Genitourinary: Penis normal    Genitourinary Comments: MOMO normal anal verge normal anal sphincter tone no palpable rectal masses prostate approximately 35 g a nodular nontender   Musculoskeletal: Normal range of motion  Neurological: He is alert and oriented to person, place, and time  Skin: He is not diaphoretic  Psychiatric: He has a normal mood and affect  His behavior is normal  Judgment and thought content normal    Vitals reviewed  Lab Results: I have personally reviewed pertinent reports  Imaging: I have personally reviewed pertinent reports  EKG, Pathology, and Other Studies: I have personally reviewed pertinent reports  VTE Prophylaxis: Sequential compression device Diana Reyna)     Code Status: [unfilled]  Advance Directive and Living Will:      Power of :    POLST:      Counseling / Coordination of Care  Total floor / unit time spent today 45 minutes  Greater than 50% of total time was spent with the patient and / or family counseling and / or coordination of care  A description of the counseling / coordination of care: Mayte Garcia

## 2020-07-16 NOTE — PROGRESS NOTES
Uroflow  Date/Time: 7/16/2020 9:14 AM  Performed by: Noe Tran MD  Authorized by: Noe Tran MD   Procedure - Urodynamics:  Procedure details: Simple Uroflow          Post-procedure:     Patient tolerance: Patient tolerated procedure well with no immediate complications      Comments: The patient only voided 61 cc with a PVR of 113 cc a maximum rate of 3 2 and average rate of less than 1 cc/second  Flow rates are sub normal and clinical correlation is recommended

## 2020-07-31 ENCOUNTER — DOCUMENTATION (OUTPATIENT)
Dept: URGENT CARE | Age: 75
End: 2020-07-31

## 2020-07-31 ENCOUNTER — TRANSCRIBE ORDERS (OUTPATIENT)
Dept: ADMINISTRATIVE | Facility: HOSPITAL | Age: 75
End: 2020-07-31

## 2020-07-31 ENCOUNTER — APPOINTMENT (OUTPATIENT)
Dept: LAB | Age: 75
End: 2020-07-31
Payer: COMMERCIAL

## 2020-07-31 ENCOUNTER — CLINICAL SUPPORT (OUTPATIENT)
Dept: URGENT CARE | Age: 75
End: 2020-07-31
Payer: COMMERCIAL

## 2020-07-31 DIAGNOSIS — Z11.59 SCREENING FOR VIRAL DISEASE: ICD-10-CM

## 2020-07-31 DIAGNOSIS — N13.8 BPH WITH OBSTRUCTION/LOWER URINARY TRACT SYMPTOMS: ICD-10-CM

## 2020-07-31 DIAGNOSIS — N40.1 BPH WITH OBSTRUCTION/LOWER URINARY TRACT SYMPTOMS: ICD-10-CM

## 2020-07-31 DIAGNOSIS — Z01.818 PRE-OP TESTING: ICD-10-CM

## 2020-07-31 DIAGNOSIS — Z01.818 PRE-OP TESTING: Primary | ICD-10-CM

## 2020-07-31 LAB
ALBUMIN SERPL BCP-MCNC: 3.9 G/DL (ref 3.5–5)
ALP SERPL-CCNC: 48 U/L (ref 46–116)
ALT SERPL W P-5'-P-CCNC: 17 U/L (ref 12–78)
ANION GAP SERPL CALCULATED.3IONS-SCNC: 5 MMOL/L (ref 4–13)
AST SERPL W P-5'-P-CCNC: 14 U/L (ref 5–45)
ATRIAL RATE: 43 BPM
BACTERIA UR QL AUTO: ABNORMAL /HPF
BASOPHILS # BLD AUTO: 0.06 THOUSANDS/ΜL (ref 0–0.1)
BASOPHILS NFR BLD AUTO: 1 % (ref 0–1)
BILIRUB SERPL-MCNC: 0.56 MG/DL (ref 0.2–1)
BILIRUB UR QL STRIP: NEGATIVE
BUN SERPL-MCNC: 41 MG/DL (ref 5–25)
CALCIUM SERPL-MCNC: 9.8 MG/DL (ref 8.3–10.1)
CHLORIDE SERPL-SCNC: 111 MMOL/L (ref 100–108)
CLARITY UR: CLEAR
CO2 SERPL-SCNC: 25 MMOL/L (ref 21–32)
COLOR UR: YELLOW
CREAT SERPL-MCNC: 1.03 MG/DL (ref 0.6–1.3)
EOSINOPHIL # BLD AUTO: 0.18 THOUSAND/ΜL (ref 0–0.61)
EOSINOPHIL NFR BLD AUTO: 3 % (ref 0–6)
ERYTHROCYTE [DISTWIDTH] IN BLOOD BY AUTOMATED COUNT: 19.2 % (ref 11.6–15.1)
GFR SERPL CREATININE-BSD FRML MDRD: 70 ML/MIN/1.73SQ M
GLUCOSE P FAST SERPL-MCNC: 95 MG/DL (ref 65–99)
GLUCOSE UR STRIP-MCNC: NEGATIVE MG/DL
HCT VFR BLD AUTO: 32.8 % (ref 36.5–49.3)
HGB BLD-MCNC: 10.6 G/DL (ref 12–17)
HGB UR QL STRIP.AUTO: NEGATIVE
HYALINE CASTS #/AREA URNS LPF: ABNORMAL /LPF
IMM GRANULOCYTES # BLD AUTO: 0.01 THOUSAND/UL (ref 0–0.2)
IMM GRANULOCYTES NFR BLD AUTO: 0 % (ref 0–2)
KETONES UR STRIP-MCNC: NEGATIVE MG/DL
LEUKOCYTE ESTERASE UR QL STRIP: NEGATIVE
LYMPHOCYTES # BLD AUTO: 2.4 THOUSANDS/ΜL (ref 0.6–4.47)
LYMPHOCYTES NFR BLD AUTO: 34 % (ref 14–44)
MCH RBC QN AUTO: 31.7 PG (ref 26.8–34.3)
MCHC RBC AUTO-ENTMCNC: 32.3 G/DL (ref 31.4–37.4)
MCV RBC AUTO: 98 FL (ref 82–98)
MONOCYTES # BLD AUTO: 0.99 THOUSAND/ΜL (ref 0.17–1.22)
MONOCYTES NFR BLD AUTO: 14 % (ref 4–12)
NEUTROPHILS # BLD AUTO: 3.44 THOUSANDS/ΜL (ref 1.85–7.62)
NEUTS SEG NFR BLD AUTO: 48 % (ref 43–75)
NITRITE UR QL STRIP: NEGATIVE
NON-SQ EPI CELLS URNS QL MICRO: ABNORMAL /HPF
NRBC BLD AUTO-RTO: 0 /100 WBCS
P AXIS: -1 DEGREES
PH UR STRIP.AUTO: 6 [PH]
PLATELET # BLD AUTO: 329 THOUSANDS/UL (ref 149–390)
PMV BLD AUTO: 9.9 FL (ref 8.9–12.7)
POTASSIUM SERPL-SCNC: 4.7 MMOL/L (ref 3.5–5.3)
PR INTERVAL: 206 MS
PROT SERPL-MCNC: 7.3 G/DL (ref 6.4–8.2)
PROT UR STRIP-MCNC: ABNORMAL MG/DL
QRS AXIS: 60 DEGREES
QRSD INTERVAL: 94 MS
QT INTERVAL: 454 MS
QTC INTERVAL: 383 MS
RBC # BLD AUTO: 3.34 MILLION/UL (ref 3.88–5.62)
RBC #/AREA URNS AUTO: ABNORMAL /HPF
SODIUM SERPL-SCNC: 141 MMOL/L (ref 136–145)
SP GR UR STRIP.AUTO: 1.02 (ref 1–1.03)
T WAVE AXIS: 64 DEGREES
UROBILINOGEN UR QL STRIP.AUTO: 0.2 E.U./DL
VENTRICULAR RATE: 43 BPM
WBC # BLD AUTO: 7.08 THOUSAND/UL (ref 4.31–10.16)
WBC #/AREA URNS AUTO: ABNORMAL /HPF

## 2020-07-31 PROCEDURE — 93010 ELECTROCARDIOGRAM REPORT: CPT | Performed by: INTERNAL MEDICINE

## 2020-07-31 PROCEDURE — U0003 INFECTIOUS AGENT DETECTION BY NUCLEIC ACID (DNA OR RNA); SEVERE ACUTE RESPIRATORY SYNDROME CORONAVIRUS 2 (SARS-COV-2) (CORONAVIRUS DISEASE [COVID-19]), AMPLIFIED PROBE TECHNIQUE, MAKING USE OF HIGH THROUGHPUT TECHNOLOGIES AS DESCRIBED BY CMS-2020-01-R: HCPCS

## 2020-07-31 PROCEDURE — 81001 URINALYSIS AUTO W/SCOPE: CPT | Performed by: UROLOGY

## 2020-07-31 PROCEDURE — 85025 COMPLETE CBC W/AUTO DIFF WBC: CPT

## 2020-07-31 PROCEDURE — 87086 URINE CULTURE/COLONY COUNT: CPT

## 2020-07-31 PROCEDURE — 80053 COMPREHEN METABOLIC PANEL: CPT

## 2020-07-31 PROCEDURE — 36415 COLL VENOUS BLD VENIPUNCTURE: CPT

## 2020-07-31 PROCEDURE — 93005 ELECTROCARDIOGRAM TRACING: CPT

## 2020-07-31 NOTE — PROGRESS NOTES
[address]        NAME: Robert Enciso is a 66 y o  male  : 1945    MRN: [id number]  DATE: 2020  TIME: 9:07 AM    There were no vitals taken for this visit  Assessment and Plan   No primary diagnosis found  No diagnosis found  Patient Instructions       Follow up with PCP in 3-5 days  Proceed to  ER if symptoms worsen  Chief Complaint   No chief complaint on file  History of Present Illness       HPI    Review of Systems   Review of Systems      Current Medications       Current Outpatient Medications:     losartan (COZAAR) 100 MG tablet, , Disp: , Rfl:     metoprolol tartrate (LOPRESSOR) 25 mg tablet, , Disp: , Rfl:     Tadalafil (CIALIS PO), Take by mouth, Disp: , Rfl:     tamsulosin (FLOMAX) 0 4 mg, , Disp: , Rfl:     venlafaxine (EFFEXOR-XR) 150 mg 24 hr capsule, , Disp: , Rfl:     Current Allergies     Allergies as of 2020 - Reviewed 2020   Allergen Reaction Noted    Lisinopril Cough 2019            The following portions of the patient's history were reviewed and updated as appropriate: allergies, current medications, past family history, past medical history, past social history, past surgical history and problem list      No past medical history on file  No past surgical history on file  No family history on file  Medications have been verified  Objective   There were no vitals taken for this visit         Physical Exam     Physical Exam        Pt for covid-10 swab only

## 2020-08-01 LAB — BACTERIA UR CULT: NORMAL

## 2020-08-03 LAB — SARS-COV-2 RNA SPEC QL NAA+PROBE: NOT DETECTED

## 2020-08-03 NOTE — PRE-PROCEDURE INSTRUCTIONS
Pre-Surgery Instructions:   Medication Instructions    losartan (COZAAR) 100 MG tablet Instructed patient per Anesthesia Guidelines   metoprolol tartrate (LOPRESSOR) 25 mg tablet Instructed patient per Anesthesia Guidelines   venlafaxine (EFFEXOR-XR) 150 mg 24 hr capsule Instructed patient per Anesthesia Guidelines

## 2020-08-06 ENCOUNTER — ANESTHESIA EVENT (OUTPATIENT)
Dept: PERIOP | Facility: HOSPITAL | Age: 75
End: 2020-08-06
Payer: COMMERCIAL

## 2020-08-06 PROBLEM — I10 HTN (HYPERTENSION): Chronic | Status: ACTIVE | Noted: 2020-08-06

## 2020-08-06 PROBLEM — F41.9 ANXIETY: Status: ACTIVE | Noted: 2020-08-06

## 2020-08-06 PROBLEM — N42.9 PROSTATIC DISORDER: Status: ACTIVE | Noted: 2020-08-06

## 2020-08-06 NOTE — ANESTHESIA PREPROCEDURE EVALUATION
Procedure:  CYSTOSCOPY WITH INSERTION UROLIFT (N/A Urethra)    Relevant Problems   ANESTHESIA  METS > 4       CARDIO   (+) HTN (hypertension)      /RENAL   (+) Prostatic disorder      NEURO/PSYCH   (+) Anxiety        Physical Exam    Airway    Mallampati score: I  TM Distance: >3 FB  Neck ROM: full     Dental       Cardiovascular  Cardiovascular exam normal    Pulmonary  Pulmonary exam normal     Other Findings        Anesthesia Plan  ASA Score- 2     Anesthesia Type- general and IV sedation with anesthesia with ASA Monitors  Additional Monitors:   Airway Plan: LMA  Plan Factors-Exercise tolerance (METS): >4 METS  Chart reviewed  EKG reviewed  Existing labs reviewed  Patient is not a current smoker  Patient not instructed to abstain from smoking on day of procedure  Patient did not smoke on day of surgery  Induction- intravenous  Postoperative Plan- Plan for postoperative opioid use  Informed Consent- Anesthetic plan and risks discussed with patient  I personally reviewed this patient with the CRNA  Discussed and agreed on the Anesthesia Plan with the CRNA  Johnathan Kennedy           No results found for: HGBA1C    Lab Results   Component Value Date    K 4 7 07/31/2020     (H) 07/31/2020    CO2 25 07/31/2020    BUN 41 (H) 07/31/2020    CREATININE 1 03 07/31/2020    GLUF 95 07/31/2020    CALCIUM 9 8 07/31/2020    AST 14 07/31/2020    ALT 17 07/31/2020    ALKPHOS 48 07/31/2020    EGFR 70 07/31/2020       Lab Results   Component Value Date    WBC 7 08 07/31/2020    HGB 10 6 (L) 07/31/2020    HCT 32 8 (L) 07/31/2020    MCV 98 07/31/2020     07/31/2020    Marked sinus bradycardia  Cannot rule out  septal MI age indeterminate  Borderline ECG  No previous ECGs available  Confirmed by Merle Query ([id number]) on 7/31/2020 10:10:55 PM

## 2020-08-07 ENCOUNTER — ANESTHESIA (OUTPATIENT)
Dept: PERIOP | Facility: HOSPITAL | Age: 75
End: 2020-08-07
Payer: COMMERCIAL

## 2020-08-07 ENCOUNTER — HOSPITAL ENCOUNTER (OUTPATIENT)
Facility: HOSPITAL | Age: 75
Setting detail: OUTPATIENT SURGERY
Discharge: HOME | End: 2020-08-07
Attending: UROLOGY | Admitting: UROLOGY
Payer: COMMERCIAL

## 2020-08-07 VITALS
OXYGEN SATURATION: 96 % | HEART RATE: 50 BPM | WEIGHT: 120 LBS | SYSTOLIC BLOOD PRESSURE: 111 MMHG | RESPIRATION RATE: 16 BRPM | TEMPERATURE: 97.2 F | HEIGHT: 69 IN | BODY MASS INDEX: 17.77 KG/M2 | DIASTOLIC BLOOD PRESSURE: 50 MMHG

## 2020-08-07 DIAGNOSIS — N42.9 PROSTATIC DISORDER: Primary | ICD-10-CM

## 2020-08-07 PROCEDURE — 52441 CYSTO INSJ TRNSPRSTC 1 IMPLT: CPT | Performed by: UROLOGY

## 2020-08-07 PROCEDURE — L8699 PROSTHETIC IMPLANT NOS: HCPCS | Performed by: UROLOGY

## 2020-08-07 PROCEDURE — 52442 CYSTO INS TRNSPRSTC IMPLT EA: CPT | Performed by: UROLOGY

## 2020-08-07 DEVICE — IMPLANT URO PROSTATE UROLIFT: Type: IMPLANTABLE DEVICE | Site: URETHRA | Status: FUNCTIONAL

## 2020-08-07 RX ORDER — MIDAZOLAM HYDROCHLORIDE 2 MG/2ML
INJECTION, SOLUTION INTRAMUSCULAR; INTRAVENOUS AS NEEDED
Status: DISCONTINUED | OUTPATIENT
Start: 2020-08-07 | End: 2020-08-07

## 2020-08-07 RX ORDER — ACETAMINOPHEN 325 MG/1
650 TABLET ORAL EVERY 6 HOURS PRN
Status: DISCONTINUED | OUTPATIENT
Start: 2020-08-07 | End: 2020-08-07 | Stop reason: HOSPADM

## 2020-08-07 RX ORDER — FENTANYL CITRATE 50 UG/ML
INJECTION, SOLUTION INTRAMUSCULAR; INTRAVENOUS AS NEEDED
Status: DISCONTINUED | OUTPATIENT
Start: 2020-08-07 | End: 2020-08-07

## 2020-08-07 RX ORDER — MAGNESIUM HYDROXIDE 1200 MG/15ML
LIQUID ORAL AS NEEDED
Status: DISCONTINUED | OUTPATIENT
Start: 2020-08-07 | End: 2020-08-07 | Stop reason: HOSPADM

## 2020-08-07 RX ORDER — CEFAZOLIN SODIUM 2 G/50ML
2000 SOLUTION INTRAVENOUS ONCE
Status: DISCONTINUED | OUTPATIENT
Start: 2020-08-07 | End: 2020-08-07 | Stop reason: HOSPADM

## 2020-08-07 RX ORDER — ONDANSETRON 2 MG/ML
4 INJECTION INTRAMUSCULAR; INTRAVENOUS ONCE AS NEEDED
Status: DISCONTINUED | OUTPATIENT
Start: 2020-08-07 | End: 2020-08-07 | Stop reason: HOSPADM

## 2020-08-07 RX ORDER — LIDOCAINE HYDROCHLORIDE 10 MG/ML
INJECTION, SOLUTION EPIDURAL; INFILTRATION; INTRACAUDAL; PERINEURAL AS NEEDED
Status: DISCONTINUED | OUTPATIENT
Start: 2020-08-07 | End: 2020-08-07

## 2020-08-07 RX ORDER — PROPOFOL 10 MG/ML
INJECTION, EMULSION INTRAVENOUS CONTINUOUS PRN
Status: DISCONTINUED | OUTPATIENT
Start: 2020-08-07 | End: 2020-08-07

## 2020-08-07 RX ORDER — HYDROCODONE BITARTRATE AND ACETAMINOPHEN 5; 325 MG/1; MG/1
1 TABLET ORAL EVERY 6 HOURS PRN
Qty: 10 TABLET | Refills: 0 | Status: SHIPPED | OUTPATIENT
Start: 2020-08-07 | End: 2020-08-17

## 2020-08-07 RX ORDER — PROPOFOL 10 MG/ML
INJECTION, EMULSION INTRAVENOUS AS NEEDED
Status: DISCONTINUED | OUTPATIENT
Start: 2020-08-07 | End: 2020-08-07

## 2020-08-07 RX ORDER — HYDROMORPHONE HCL/PF 1 MG/ML
0.5 SYRINGE (ML) INJECTION
Status: DISCONTINUED | OUTPATIENT
Start: 2020-08-07 | End: 2020-08-07 | Stop reason: HOSPADM

## 2020-08-07 RX ORDER — SODIUM CHLORIDE, SODIUM LACTATE, POTASSIUM CHLORIDE, CALCIUM CHLORIDE 600; 310; 30; 20 MG/100ML; MG/100ML; MG/100ML; MG/100ML
50 INJECTION, SOLUTION INTRAVENOUS CONTINUOUS
Status: DISCONTINUED | OUTPATIENT
Start: 2020-08-07 | End: 2020-08-07 | Stop reason: HOSPADM

## 2020-08-07 RX ORDER — CEFAZOLIN SODIUM 2 G/50ML
SOLUTION INTRAVENOUS AS NEEDED
Status: DISCONTINUED | OUTPATIENT
Start: 2020-08-07 | End: 2020-08-07

## 2020-08-07 RX ORDER — HYDROCODONE BITARTRATE AND ACETAMINOPHEN 5; 325 MG/1; MG/1
1 TABLET ORAL EVERY 6 HOURS PRN
Status: DISCONTINUED | OUTPATIENT
Start: 2020-08-07 | End: 2020-08-07 | Stop reason: HOSPADM

## 2020-08-07 RX ADMIN — SODIUM CHLORIDE, SODIUM LACTATE, POTASSIUM CHLORIDE, AND CALCIUM CHLORIDE: .6; .31; .03; .02 INJECTION, SOLUTION INTRAVENOUS at 12:11

## 2020-08-07 RX ADMIN — HYDROMORPHONE HYDROCHLORIDE 0.5 MG: 1 INJECTION, SOLUTION INTRAMUSCULAR; INTRAVENOUS; SUBCUTANEOUS at 13:29

## 2020-08-07 RX ADMIN — CEFAZOLIN SODIUM 2000 MG: 2 SOLUTION INTRAVENOUS at 12:39

## 2020-08-07 RX ADMIN — FENTANYL CITRATE 100 MCG: 50 INJECTION INTRAMUSCULAR; INTRAVENOUS at 12:29

## 2020-08-07 RX ADMIN — PROPOFOL 50 MG: 10 INJECTION, EMULSION INTRAVENOUS at 12:31

## 2020-08-07 RX ADMIN — MIDAZOLAM HYDROCHLORIDE 2 MG: 1 INJECTION, SOLUTION INTRAMUSCULAR; INTRAVENOUS at 12:29

## 2020-08-07 RX ADMIN — LIDOCAINE HYDROCHLORIDE 50 MG: 10 INJECTION, SOLUTION EPIDURAL; INFILTRATION; INTRACAUDAL; PERINEURAL at 12:30

## 2020-08-07 RX ADMIN — PROPOFOL 100 MCG/KG/MIN: 10 INJECTION, EMULSION INTRAVENOUS at 12:32

## 2020-08-07 NOTE — OP NOTE
OPERATIVE REPORT  PATIENT NAME: Robert Russ    :  1945  MRN: [id number]  Pt Location:  OR ROOM 10    SURGERY DATE: 2020    Surgeon(s) and Role:     * Vidya Aparicio MD - Primary    Preop Diagnosis:  Enlarged prostate with urinary obstruction [N40 1, N13 8]    Post-Op Diagnosis Codes:     * Enlarged prostate with urinary obstruction [N40 1, N13 8]    Procedure(s) (LRB):  CYSTOSCOPY WITH INSERTION UROLIFT (N/A) x 7    Specimen(s):  * No specimens in log *    Estimated Blood Loss:   Minimal    Drains:  Urethral Catheter Latex 20 Fr  (Active)   Reasons to continue Urinary Catheter  Post-operative urological requirements 20 1259   Number of days: 0       Anesthesia Type:   General    Operative Indications:  Enlarged prostate with urinary obstruction [N40 1, N13 8]       Operative Findings:  Bilobar enlargement of the lateral lobes of the prostate with visual occlusion of the bladder outlet  After placement of 6 Uro lift implants there was excellent anterior channel created within prostatic urethra  There was some persistent anterior tissue and somewhat of a high-riding posterior bladder neck however this did not appear to be significantly obstructive  There was no fishmouth thing the bladder outlet  There was no significant bleeding  Urinary bladder was moderately trabeculated without intrinsic lesion  See operative note below    Complications:   None    Procedure and Technique:  Patient was seen in the holding area with his wife and we discussed Uro lift  The patient again agreed to proceed with the procedure understanding risks of infection bleeding retention need for catheter possible increase in urinary urgency frequency or need for additional operative procedures  The patient agreed and wished to proceed with the procedure    He was taken to the operating room identified by the surgeon and after LMA anesthesia was induced he was prepped and draped usual sterile fashion in dorsal lithotomy position  Using a 21 [employer] [place] cystoscope with a 0 degree lens cystourethroscopy was performed  The anterior urethra was within normal limits without stricture lesion  The prostatic urethra revealed bilobar enlargement of the lateral lobes of the prostate with visual occlusion to the bladder outlet  Examination the urinary bladder took place review revealing no intrinsic lesions no extrinsic mass compression but moderate trabeculation  Ureteral orifices were normal position and configuration bilaterally with clear efflux bilaterally  A total of 7 Uro lift implants were placed as follows:  4 were placed in a stacking fashion just distal to the bladder outlet avoiding any intravesical penetration-placed at 10, 1 at 2, another at 6 and another at 1 to avoid fishmouth thing of the bladder outlet  An additional 2 Uro lift were placed at the apex of the prostate at 10 and 2 and a 7th placed in the mid gland on the patient's right at 9:00 a m  Excellent separation of the lateral lobes of the prostate were achieved  There was no active bleeding  Efflux was clear from the bladder  There were no intravesical components  All equipment was removed the patient's urinary bladder except for the Uro lift implants and then a 20 Amharic Mancia catheter was placed per urethra into the urinary bladder and efflux was totally clear  The bladder was irrigated was clear  Patient was then extubated transferred to the recovery room in good condition  There were no complications  I was present for the entire procedure and I was present for all critical portions of the procedure    The patient left the operating room with an indwelling Mancia catheter and instructions will be given in PACU that when the patient becomes fully awakened his urine clears that his Mancia can be removed for a voiding trial   The patient will follow up my office in approximately 1 month, 1 week prior to that he will discontinue tamsulosin    AUA symptom score uroflow and PVR will be checked upon return to the office in 1 month    Patient Disposition:  PACU     SIGNATURE: Amilcar Alexander MD  DATE: August 7, 2020  TIME: 1:12 PM

## 2020-08-07 NOTE — INTERVAL H&P NOTE
H&P reviewed  After examining the patient I find no changes in the patients condition since the H&P had been written      Vitals:    08/07/20 1037   BP: 144/74   Pulse: (!) 52   Resp: 20   Temp: 97 7 °F (36 5 °C)   SpO2: 97%

## 2020-08-07 NOTE — DISCHARGE INSTRUCTIONS
Benign Prostatic Hypertrophy   WHAT YOU NEED TO KNOW:   Benign prostatic hypertrophy (BPH) is a condition that causes your prostate gland to grow larger than normal  The prostate gland is the male sex gland that produces a fluid that is part of semen  It is about the size of a walnut and it is located under the bladder  As the prostate grows, it can squeeze the urethra  This can block urine flow and cause urinary problems  DISCHARGE INSTRUCTIONS:   Medicines:   · Alpha blockers: This medicine relaxes the muscles in your prostate and bladder  It may help you urinate more easily  · 5 alpha reductase inhibitors: These medicines block the production of a hormone that causes the prostate to get larger  It may help to slow the growth of the prostate or shrink the prostate  · Take your medicine as directed  Contact your healthcare provider if you think your medicine is not helping or if you have side effects  Tell him of her if you are allergic to any medicine  Keep a list of the medicines, vitamins, and herbs you take  Include the amounts, and when and why you take them  Bring the list or the pill bottles to follow-up visits  Carry your medicine list with you in case of an emergency  Follow up with your healthcare provider as directed:  Write down your questions so you remember to ask them during your visits  Manage BPH:   · Do not let your bladder get too full before you empty it  Urinate when you feel the urge  · Limit alcohol  Do not drink large amounts of any liquid at one time  · Decrease the amount of salt you eat  Examples of salty foods are chips, cured meats, and canned soups  Do not use table salt  · Healthcare providers may tell you not to eat spicy foods such as chilli peppers  This may help you find out if spicy food makes your BPH symptoms worse  · You may have sex if you feel well  Contact your healthcare provider if:   · There is a large amount of blood in your urine  · Your signs and symptoms get worse  · You have a fever  · You have questions or concerns about your condition or care  Return to the emergency department if:   · You are unable to urinate  · Your bladder feels very full and painful  © 2017 [employer] Information is for End User's use only and may not be sold, redistributed or otherwise used for commercial purposes  All illustrations and images included in CareNotes® are the copyrighted property of A D A M [employer] Inc  or [employer]  The above information is an  only  It is not intended as medical advice for individual conditions or treatments  Talk to your doctor, nurse or pharmacist before following any medical regimen to see if it is safe and effective for you

## 2020-08-07 NOTE — ANESTHESIA POSTPROCEDURE EVALUATION
Post-Op Assessment Note    CV Status:  Stable  Pain Score: 0    Pain management: adequate     Mental Status:  Sleepy   Hydration Status:  Euvolemic   PONV Controlled:  Controlled   Airway Patency:  Patent      Post Op Vitals Reviewed: Yes      Staff: CRNA         No complications documented      BP      Temp      Pulse    Resp      SpO2

## 2020-08-07 NOTE — INTERVAL H&P NOTE
H&P reviewed  After examining the patient I find no changes in the patients condition since the H&P had been written  /74   Pulse (!) 52   Temp 97 7 °F (36 5 °C)   Resp 20   Ht 5' 9" (1 753 m)   Wt 54 4 kg (120 lb)   SpO2 97%   BMI 17 72 kg/m²       Vitals:    08/07/20 1037   BP: 144/74   Pulse: (!) 52   Resp: 20   Temp: 97 7 °F (36 5 °C)   SpO2: 97%

## 2020-08-10 ENCOUNTER — TELEPHONE (OUTPATIENT)
Dept: OTHER | Facility: HOSPITAL | Age: 75
End: 2020-08-10

## 2020-08-10 ENCOUNTER — HOSPITAL ENCOUNTER (EMERGENCY)
Facility: HOSPITAL | Age: 75
Discharge: HOME | End: 2020-08-10
Attending: EMERGENCY MEDICINE | Admitting: EMERGENCY MEDICINE
Payer: COMMERCIAL

## 2020-08-10 VITALS
RESPIRATION RATE: 20 BRPM | OXYGEN SATURATION: 98 % | TEMPERATURE: 98.1 F | SYSTOLIC BLOOD PRESSURE: 143 MMHG | BODY MASS INDEX: 20.53 KG/M2 | DIASTOLIC BLOOD PRESSURE: 71 MMHG | WEIGHT: 139 LBS | HEART RATE: 50 BPM

## 2020-08-10 DIAGNOSIS — R33.9 URINARY RETENTION: Primary | ICD-10-CM

## 2020-08-10 DIAGNOSIS — R31.9 HEMATURIA: ICD-10-CM

## 2020-08-10 LAB
BACTERIA UR QL AUTO: ABNORMAL /HPF
BILIRUB UR QL STRIP: NEGATIVE
CLARITY UR: ABNORMAL
COLOR UR: ABNORMAL
GLUCOSE UR STRIP-MCNC: NEGATIVE MG/DL
HGB UR QL STRIP.AUTO: 250
KETONES UR STRIP-MCNC: NEGATIVE MG/DL
LEUKOCYTE ESTERASE UR QL STRIP: 25
NITRITE UR QL STRIP: NEGATIVE
NON-SQ EPI CELLS URNS QL MICRO: ABNORMAL /HPF
PH UR STRIP.AUTO: 5 [PH]
PROT UR STRIP-MCNC: ABNORMAL MG/DL
RBC #/AREA URNS AUTO: ABNORMAL /HPF
SP GR UR STRIP.AUTO: 1.01 (ref 1–1.04)
UROBILINOGEN UA: NEGATIVE MG/DL
WBC #/AREA URNS AUTO: ABNORMAL /HPF

## 2020-08-10 PROCEDURE — 99283 EMERGENCY DEPT VISIT LOW MDM: CPT

## 2020-08-10 PROCEDURE — 99283 EMERGENCY DEPT VISIT LOW MDM: CPT | Performed by: EMERGENCY MEDICINE

## 2020-08-10 PROCEDURE — 81003 URINALYSIS AUTO W/O SCOPE: CPT | Performed by: EMERGENCY MEDICINE

## 2020-08-10 PROCEDURE — 81001 URINALYSIS AUTO W/SCOPE: CPT | Performed by: EMERGENCY MEDICINE

## 2020-08-10 NOTE — ED NOTES
Pt felt relieved after insertion of catheter and was able to void  Pt does not have complaints at this time       Veronica Swan  08/10/20 0901

## 2020-08-10 NOTE — DISCHARGE INSTRUCTIONS
Macnia Catheter Placement and Care   WHAT YOU NEED TO KNOW:   A Mancia catheter is a sterile tube that is inserted into your bladder to drain urine  It is also called an indwelling urinary catheter  The tip of the catheter has a small balloon filled with solution that holds the catheter inside your bladder  DISCHARGE INSTRUCTIONS:   Return to the emergency department if:   · Your catheter comes out  · You suddenly have material that looks like sand in the tubing or drainage bag  · No urine is draining into the bag and you have checked the system  · You have pain in your hip, back, pelvis, or lower abdomen  · You are confused or cannot think clearly  Contact your healthcare provider if:   · You have a fever  · You have bladder spasms for more than 1 day after the catheter is placed  · You see blood in the tubing or drainage bag  · You have a rash or itching where the catheter tube is secured to your skin  · Urine leaks from or around the catheter, tubing, or drainage bag  · The closed drainage system has accidently come open or apart  · You see a layer of crystals inside the tubing  · You have questions or concerns about your condition or care  Care for your Mancai catheter:   · Clean your genital area 2 times every day  Clean your catheter and the area around where it was inserted  Use soap and water  Clean your anal opening and catheter area after every bowel movement  · Secure the catheter tube  so you do not pull or move the catheter  This helps prevent pain and bladder spasms  Healthcare providers will show you how to use medical tape or a strap to secure the catheter tube to your body  · Keep a closed drainage system  Your Mancia catheter should always be attached to the drainage bag to form a closed system  Do not disconnect any part of the closed system unless you need to change the bag  Care for your drainage bag:   · Ask if a leg bag is right for you    A leg bag can be worn under your clothes  Ask your healthcare provider for more information about a leg bag  · Keep the drainage bag below the level of your waist   This helps stop urine from moving back up the tubing and into your bladder  Do not loop or kink the tubing  This can cause urine to back up and collect in your bladder  Do not let the drainage bag touch or lie on the floor  · Empty the drainage bag when needed  The weight of a full drainage bag can be painful  Empty the drainage bag every 3 to 6 hours or when it is ? full  · Clean and change the drainage bag as directed  Ask your healthcare provider how often you should change the drainage bag and what cleaning solution to use  Wear disposable gloves when you change the bag  Do not allow the end of the catheter or tubing to touch anything  Clean the ends with an alcohol pad before you reconnect them  What to do if problems develop:   · No urine is draining into the bag:      ¨ Check for kinks in the tubing and straighten them out  ¨ Check the tape or strap used to secure the catheter tube to your skin  Make sure it is not blocking the tube  ¨ Make sure you are not sitting or lying on the tubing  ¨ Make sure the urine bag is hanging below the level of your waist     · Urine leaks from or around the catheter, tubing, or drainage bag:  Check if the closed drainage system has accidently come open or apart  Clean the catheter and tubing ends with a new alcohol pad and reconnect them  Prevent an infection:   · Wash your hands often  Wash before and after you touch your catheter, tubing, or drainage bag  Use soap and water  Wear clean disposable gloves when you care for your catheter or disconnect the drainage bag  Wash your hands before you prepare or eat food  · Drink liquids as directed  Ask your healthcare provider how much liquid to drink each day and which liquids are best for you   Liquids will help flush your kidneys and bladder to help prevent infection  Follow up with your healthcare provider as directed:  Write down your questions so you remember to ask them during your visits  © 2017 [employer] Information is for End User's use only and may not be sold, redistributed or otherwise used for commercial purposes  All illustrations and images included in CareNotes® are the copyrighted property of A D A M , Inc  or [employer]  The above information is an  only  It is not intended as medical advice for individual conditions or treatments  Talk to your doctor, nurse or pharmacist before following any medical regimen to see if it is safe and effective for you

## 2020-08-10 NOTE — TELEPHONE ENCOUNTER
Dr Richie Barrera patient, s/p Urolift 8/7  Voided well through the weekend, retention this AM 8/10  A Mancia placed in [address] for 450ml with relief  Please schedule for void trial in 2-3 days thanks!

## 2020-08-10 NOTE — TELEPHONE ENCOUNTER
Call placed to patient and spoke with him   Appointment scheduled with nurse for Wednesday at 9:30am for mccartney catheter removal

## 2020-08-10 NOTE — PROGRESS NOTES
Pt  Gianni Fairly to ER today complaining of not being able to pass urine  The ER placed a mccartney and pt   Is going to call Dr Edison Hernandez office

## 2020-08-10 NOTE — ED NOTES
Mancia drainage bag was changed to a leg bag because pt is being discharged to f/u with Urology        Krystyna Alanis  08/10/20 7414

## 2020-08-11 ENCOUNTER — TELEPHONE (OUTPATIENT)
Dept: UROLOGY | Facility: MEDICAL CENTER | Age: 75
End: 2020-08-11

## 2020-08-12 ENCOUNTER — CLINICAL SUPPORT (OUTPATIENT)
Dept: UROLOGY | Facility: MEDICAL CENTER | Age: 75
End: 2020-08-12
Payer: COMMERCIAL

## 2020-08-12 VITALS
DIASTOLIC BLOOD PRESSURE: 60 MMHG | SYSTOLIC BLOOD PRESSURE: 110 MMHG | HEIGHT: 69 IN | BODY MASS INDEX: 19.85 KG/M2 | WEIGHT: 134 LBS | TEMPERATURE: 97.1 F | HEART RATE: 58 BPM

## 2020-08-12 DIAGNOSIS — N40.1 BPH WITH OBSTRUCTION/LOWER URINARY TRACT SYMPTOMS: Primary | ICD-10-CM

## 2020-08-12 DIAGNOSIS — N13.8 BPH WITH OBSTRUCTION/LOWER URINARY TRACT SYMPTOMS: Primary | ICD-10-CM

## 2020-08-12 PROCEDURE — 99211 OFF/OP EST MAY X REQ PHY/QHP: CPT

## 2020-08-12 NOTE — PROGRESS NOTES
8/12/2020    Robert Prather is a 66 y o  male  [id number]    Diagnosis:  Chief Complaint     Benign Prostatic Hypertrophy with urinary obstruction        Patient presents for Mancia catheter removal s/p Cysto, Urolift on 8/7/20 with Dr Arcos Current  Plan:  Post Op check in one month with Uroflow and PVR  Procedure: Mancia removed without difficulty, patient tolerated procedure well  Urine draining clear yellow with a few specs of tissue/clots  Denies fever or urinary symptoms  Patient instructed to increase fluids, especially water, and limit caffeine intake  To return to office if unable to void within 4-6 hours, or has increased discomfort      Vitals:    08/12/20 0937   BP: 110/60   Pulse: 58   Temp: (!) 97 1 °F (36 2 °C)   Weight: 60 8 kg (134 lb)   Height: 5' 9" (1 753 m)       Sonya Guerin LPN

## 2020-09-24 ENCOUNTER — PROCEDURE VISIT (OUTPATIENT)
Dept: UROLOGY | Facility: MEDICAL CENTER | Age: 75
End: 2020-09-24
Payer: COMMERCIAL

## 2020-09-24 VITALS — TEMPERATURE: 97.4 F | WEIGHT: 134 LBS | HEIGHT: 69 IN | BODY MASS INDEX: 19.85 KG/M2

## 2020-09-24 DIAGNOSIS — N40.1 BPH WITH OBSTRUCTION/LOWER URINARY TRACT SYMPTOMS: Primary | ICD-10-CM

## 2020-09-24 DIAGNOSIS — N52.03 COMBINED ARTERIAL INSUFFICIENCY AND CORPORO-VENOUS OCCLUSIVE ERECTILE DYSFUNCTION: ICD-10-CM

## 2020-09-24 DIAGNOSIS — N13.8 BPH WITH OBSTRUCTION/LOWER URINARY TRACT SYMPTOMS: Primary | ICD-10-CM

## 2020-09-24 LAB
SL AMB  POCT GLUCOSE, UA: NORMAL
SL AMB LEUKOCYTE ESTERASE,UA: NORMAL
SL AMB POCT BILIRUBIN,UA: NORMAL
SL AMB POCT BLOOD,UA: NORMAL
SL AMB POCT CLARITY,UA: CLEAR
SL AMB POCT COLOR,UA: YELLOW
SL AMB POCT KETONES,UA: NORMAL
SL AMB POCT NITRITE,UA: NORMAL
SL AMB POCT PH,UA: 6.5
SL AMB POCT SPECIFIC GRAVITY,UA: 1.02
SL AMB POCT URINE PROTEIN: NORMAL
SL AMB POCT UROBILINOGEN: 0.2

## 2020-09-24 PROCEDURE — 99214 OFFICE O/P EST MOD 30 MIN: CPT | Performed by: UROLOGY

## 2020-09-24 PROCEDURE — 81003 URINALYSIS AUTO W/O SCOPE: CPT | Performed by: UROLOGY

## 2020-09-24 RX ORDER — TADALAFIL 20 MG/1
20 TABLET ORAL AS NEEDED
Qty: 10 TABLET | Status: SHIPPED | OUTPATIENT
Start: 2020-09-24 | End: 2020-12-13 | Stop reason: SDUPTHER

## 2020-09-24 NOTE — PROGRESS NOTES
Impression:  1  BPH with lower urinary tract symptoms status post Uro lift August 2020  The patient noted that his urinary pattern is markedly improved with feelings of complete bladder emptying markedly improved urinary flow less frequency and urgency  He does still have nocturia 2-3 times per night but this is slightly less than prior to Uro lift  PVR is 25 cc  The patient will discontinue tamsulosin and be re-evaluated with AUA symptom score uroflow and PVR in a year  2   Erectile dysfunction-previously prescribe Cialis 5 mg daily but the patient has been taking it on demand with a good erectile response but early loss of erection  He will increase the dose of Cialis to 20 mg on demand and report back should further intervention be necessary  Plan:  Return 1 year  AUA symptom score uroflow and PVR after discontinuing tamsulosin  Increased Cialis to 20 mg on demand-prescription written  Comprehensive metabolic panel PSA 1 year      History of present illness 75-year-old male who is approximately 1 month status post Uro lift  The patient notes marked improvement in his urinary pattern with less frequency and urgency during the daytime improvement of his urinary flow no urinary incontinence  He did not discontinue tamsulosin and was advised to do so  He also notes his erections have improved on Cialis 5 mg but he has been taking that on demand and he requests a higher dose because he is dealing with early loss of erection  Patient presents for evaluation      Allergies medications past medical and surgical history social history unchanged from preop and reviewed on this chart    System review-nocturia 2-3 times per nightly otherwise negative    Physical examination  Well-nourished well-developed male appearing stated age in no apparent distress  HEENT normocephalic atraumatic  Pulmonary clear without rhonchi or rales  Abdomen soft nondistended nontender  Extremities full range of motion  Neurologic intact

## 2020-09-24 NOTE — LETTER
September 24, 2020      [place], [address]    Patient: Robert Dotson   YOB: 1945   Date of Visit: 9/24/2020       Dear Dr Vj Bashir:    Thank you for referring Robert Pate to me for evaluation  Below are my notes for this consultation  If you have questions, please do not hesitate to call me  I look forward to following your patient along with you  Sincerely,        Carmen Perry MD        CC: No Recipients  Carmen Perry MD  9/24/2020 11:12 AM  Sign when Signing Visit  Impression:  1  BPH with lower urinary tract symptoms status post Uro lift August 2020  The patient noted that his urinary pattern is markedly improved with feelings of complete bladder emptying markedly improved urinary flow less frequency and urgency  He does still have nocturia 2-3 times per night but this is slightly less than prior to Uro lift  PVR is 25 cc  The patient will discontinue tamsulosin and be re-evaluated with AUA symptom score uroflow and PVR in a year  2   Erectile dysfunction-previously prescribe Cialis 5 mg daily but the patient has been taking it on demand with a good erectile response but early loss of erection  He will increase the dose of Cialis to 20 mg on demand and report back should further intervention be necessary  Plan:  Return 1 year  AUA symptom score uroflow and PVR after discontinuing tamsulosin  Increased Cialis to 20 mg on demand-prescription written  Comprehensive metabolic panel PSA 1 year      History of present illness 52-year-old male who is approximately 1 month status post Uro lift  The patient notes marked improvement in his urinary pattern with less frequency and urgency during the daytime improvement of his urinary flow no urinary incontinence  He did not discontinue tamsulosin and was advised to do so    He also notes his erections have improved on Cialis 5 mg but he has been taking that on demand and he requests a higher dose because he is dealing with early loss of erection  Patient presents for evaluation      Allergies medications past medical and surgical history social history unchanged from preop and reviewed on this chart    System review-nocturia 2-3 times per nightly otherwise negative    Physical examination  Well-nourished well-developed male appearing stated age in no apparent distress  HEENT normocephalic atraumatic  Pulmonary clear without rhonchi or rales  Abdomen soft nondistended nontender  Extremities full range of motion  Neurologic intact

## 2020-10-02 ENCOUNTER — APPOINTMENT (OUTPATIENT)
Dept: LAB | Age: 75
End: 2020-10-02
Payer: COMMERCIAL

## 2020-10-02 ENCOUNTER — TRANSCRIBE ORDERS (OUTPATIENT)
Dept: ADMINISTRATIVE | Facility: HOSPITAL | Age: 75
End: 2020-10-02

## 2020-10-02 DIAGNOSIS — D64.9 ANEMIA, UNSPECIFIED TYPE: Primary | ICD-10-CM

## 2020-10-02 DIAGNOSIS — D64.9 ANEMIA, UNSPECIFIED TYPE: ICD-10-CM

## 2020-10-02 LAB
BASOPHILS # BLD AUTO: 0.06 THOUSANDS/ΜL (ref 0–0.1)
BASOPHILS NFR BLD AUTO: 1 % (ref 0–1)
EOSINOPHIL # BLD AUTO: 0.11 THOUSAND/ΜL (ref 0–0.61)
EOSINOPHIL NFR BLD AUTO: 1 % (ref 0–6)
ERYTHROCYTE [DISTWIDTH] IN BLOOD BY AUTOMATED COUNT: 18.8 % (ref 11.6–15.1)
HCT VFR BLD AUTO: 30.4 % (ref 36.5–49.3)
HGB BLD-MCNC: 9.8 G/DL (ref 12–17)
IMM GRANULOCYTES # BLD AUTO: 0.02 THOUSAND/UL (ref 0–0.2)
IMM GRANULOCYTES NFR BLD AUTO: 0 % (ref 0–2)
LYMPHOCYTES # BLD AUTO: 2.59 THOUSANDS/ΜL (ref 0.6–4.47)
LYMPHOCYTES NFR BLD AUTO: 30 % (ref 14–44)
MCH RBC QN AUTO: 31.6 PG (ref 26.8–34.3)
MCHC RBC AUTO-ENTMCNC: 32.2 G/DL (ref 31.4–37.4)
MCV RBC AUTO: 98 FL (ref 82–98)
MONOCYTES # BLD AUTO: 1.09 THOUSAND/ΜL (ref 0.17–1.22)
MONOCYTES NFR BLD AUTO: 13 % (ref 4–12)
NEUTROPHILS # BLD AUTO: 4.65 THOUSANDS/ΜL (ref 1.85–7.62)
NEUTS SEG NFR BLD AUTO: 55 % (ref 43–75)
NRBC BLD AUTO-RTO: 0 /100 WBCS
PLATELET # BLD AUTO: 283 THOUSANDS/UL (ref 149–390)
PMV BLD AUTO: 9.8 FL (ref 8.9–12.7)
RBC # BLD AUTO: 3.1 MILLION/UL (ref 3.88–5.62)
WBC # BLD AUTO: 8.52 THOUSAND/UL (ref 4.31–10.16)

## 2020-10-02 PROCEDURE — 36415 COLL VENOUS BLD VENIPUNCTURE: CPT

## 2020-10-02 PROCEDURE — 85025 COMPLETE CBC W/AUTO DIFF WBC: CPT

## 2020-12-13 DIAGNOSIS — N52.03 COMBINED ARTERIAL INSUFFICIENCY AND CORPORO-VENOUS OCCLUSIVE ERECTILE DYSFUNCTION: ICD-10-CM

## 2020-12-13 RX ORDER — TADALAFIL 20 MG/1
TABLET ORAL
Qty: 10 TABLET | Refills: 11 | OUTPATIENT
Start: 2020-12-13

## 2020-12-13 RX ORDER — TADALAFIL 20 MG/1
20 TABLET ORAL AS NEEDED
Qty: 10 TABLET | Status: SHIPPED | OUTPATIENT
Start: 2020-12-13 | End: 2021-10-15 | Stop reason: SDUPTHER

## 2021-02-12 DIAGNOSIS — Z23 ENCOUNTER FOR IMMUNIZATION: ICD-10-CM

## 2021-03-22 ENCOUNTER — TELEPHONE (OUTPATIENT)
Dept: UROLOGY | Facility: AMBULATORY SURGERY CENTER | Age: 76
End: 2021-03-22

## 2021-03-22 DIAGNOSIS — N13.8 BPH WITH OBSTRUCTION/LOWER URINARY TRACT SYMPTOMS: Primary | ICD-10-CM

## 2021-03-22 DIAGNOSIS — N40.1 BPH WITH OBSTRUCTION/LOWER URINARY TRACT SYMPTOMS: Primary | ICD-10-CM

## 2021-03-22 NOTE — TELEPHONE ENCOUNTER
Pt stating he had a Urolift on 8/7/20; he is experiencing urinary frequency at night  He has been getting up about every 2 hours for the past couple of weeks  He would like a phone call to discuss his symptoms

## 2021-03-22 NOTE — TELEPHONE ENCOUNTER
Pt of Dr Rei Barnes managed at the [facility] office  History of BPH  Last seen in the office on 9- for follow up s/p Urolift procedure  Pt is calling into the office with concerns of increased urinary frequency mostly at night  He is up every 2 hours having to empty his bladder  Call placed to patient to further assess his symptoms  He did not answer  LMOM for him to contact the office  Number provided in message

## 2021-03-23 RX ORDER — TAMSULOSIN HYDROCHLORIDE 0.4 MG/1
0.4 CAPSULE ORAL
Qty: 90 CAPSULE | Refills: 3 | Status: SHIPPED | OUTPATIENT
Start: 2021-03-23 | End: 2021-10-15 | Stop reason: SDUPTHER

## 2021-03-23 NOTE — TELEPHONE ENCOUNTER
Call placed to patient and spoke with him  Pt informed me that for the past 3 months he has been experiencing increased nocturia s/p Urolift procedure in 2020  Pt stated that he is up at least 3-4 times a night with the urge to urinate  Pt denies any burning with urination, fever, chills or flank pain  His only complaint is his nocturia  He is concerned because he had the Urolift procedure to help with these symptoms and they seem to be worse since the procedure  Pt is asking what can be done for his symptoms and what the recommendations the provider has to help control his symptoms  Informed patient that I will reach out to the provider with his concerns and someone from the office with contact him with a follow up plan of care

## 2021-10-15 ENCOUNTER — OFFICE VISIT (OUTPATIENT)
Dept: UROLOGY | Facility: MEDICAL CENTER | Age: 76
End: 2021-10-15
Payer: COMMERCIAL

## 2021-10-15 VITALS
HEIGHT: 65 IN | WEIGHT: 137 LBS | SYSTOLIC BLOOD PRESSURE: 120 MMHG | DIASTOLIC BLOOD PRESSURE: 80 MMHG | HEART RATE: 60 BPM | BODY MASS INDEX: 22.82 KG/M2

## 2021-10-15 DIAGNOSIS — R35.1 NOCTURIA: ICD-10-CM

## 2021-10-15 DIAGNOSIS — N13.8 BPH WITH OBSTRUCTION/LOWER URINARY TRACT SYMPTOMS: Primary | ICD-10-CM

## 2021-10-15 DIAGNOSIS — N52.03 COMBINED ARTERIAL INSUFFICIENCY AND CORPORO-VENOUS OCCLUSIVE ERECTILE DYSFUNCTION: ICD-10-CM

## 2021-10-15 DIAGNOSIS — N40.1 BPH WITH OBSTRUCTION/LOWER URINARY TRACT SYMPTOMS: Primary | ICD-10-CM

## 2021-10-15 DIAGNOSIS — N52.1 ERECTILE DYSFUNCTION DUE TO DISEASES CLASSIFIED ELSEWHERE: ICD-10-CM

## 2021-10-15 LAB
POST-VOID RESIDUAL VOLUME, ML POC: 19 ML
SL AMB  POCT GLUCOSE, UA: ABNORMAL
SL AMB LEUKOCYTE ESTERASE,UA: ABNORMAL
SL AMB POCT BILIRUBIN,UA: ABNORMAL
SL AMB POCT BLOOD,UA: ABNORMAL
SL AMB POCT CLARITY,UA: CLEAR
SL AMB POCT COLOR,UA: YELLOW
SL AMB POCT KETONES,UA: ABNORMAL
SL AMB POCT NITRITE,UA: ABNORMAL
SL AMB POCT PH,UA: 5.5
SL AMB POCT SPECIFIC GRAVITY,UA: 1.02
SL AMB POCT URINE PROTEIN: ABNORMAL
SL AMB POCT UROBILINOGEN: 0.2

## 2021-10-15 PROCEDURE — 51798 US URINE CAPACITY MEASURE: CPT | Performed by: NURSE PRACTITIONER

## 2021-10-15 PROCEDURE — 99214 OFFICE O/P EST MOD 30 MIN: CPT | Performed by: NURSE PRACTITIONER

## 2021-10-15 PROCEDURE — 81003 URINALYSIS AUTO W/O SCOPE: CPT | Performed by: NURSE PRACTITIONER

## 2021-10-15 RX ORDER — TAMSULOSIN HYDROCHLORIDE 0.4 MG/1
0.4 CAPSULE ORAL
Qty: 90 CAPSULE | Refills: 3 | Status: SHIPPED | OUTPATIENT
Start: 2021-10-15

## 2021-10-15 RX ORDER — TADALAFIL 5 MG/1
5 TABLET ORAL DAILY PRN
Qty: 90 TABLET | Refills: 3 | Status: SHIPPED | OUTPATIENT
Start: 2021-10-15

## 2021-10-15 RX ORDER — TADALAFIL 20 MG/1
20 TABLET ORAL AS NEEDED
Qty: 30 TABLET | Refills: 11 | Status: SHIPPED | OUTPATIENT
Start: 2021-10-15

## 2021-10-15 RX ORDER — TADALAFIL 5 MG/1
TABLET ORAL
COMMUNITY
Start: 2021-09-20 | End: 2021-10-15 | Stop reason: SDUPTHER

## 2021-10-15 RX ORDER — METOPROLOL SUCCINATE 50 MG/1
TABLET, EXTENDED RELEASE ORAL
COMMUNITY
Start: 2021-07-26

## 2021-11-18 ENCOUNTER — PROCEDURE VISIT (OUTPATIENT)
Dept: UROLOGY | Facility: MEDICAL CENTER | Age: 76
End: 2021-11-18
Payer: COMMERCIAL

## 2021-11-18 VITALS
SYSTOLIC BLOOD PRESSURE: 120 MMHG | WEIGHT: 129 LBS | HEIGHT: 65 IN | DIASTOLIC BLOOD PRESSURE: 76 MMHG | BODY MASS INDEX: 21.49 KG/M2 | HEART RATE: 70 BPM

## 2021-11-18 DIAGNOSIS — N52.1 ERECTILE DYSFUNCTION DUE TO DISEASES CLASSIFIED ELSEWHERE: ICD-10-CM

## 2021-11-18 DIAGNOSIS — N40.1 BPH WITH OBSTRUCTION/LOWER URINARY TRACT SYMPTOMS: ICD-10-CM

## 2021-11-18 DIAGNOSIS — R35.1 NOCTURIA: Primary | ICD-10-CM

## 2021-11-18 DIAGNOSIS — N13.8 BPH WITH OBSTRUCTION/LOWER URINARY TRACT SYMPTOMS: ICD-10-CM

## 2021-11-18 LAB — POST-VOID RESIDUAL VOLUME, ML POC: 11 ML

## 2021-11-18 PROCEDURE — 52000 CYSTOURETHROSCOPY: CPT | Performed by: UROLOGY

## 2021-11-18 PROCEDURE — 51798 US URINE CAPACITY MEASURE: CPT | Performed by: UROLOGY

## 2021-11-18 PROCEDURE — 99214 OFFICE O/P EST MOD 30 MIN: CPT | Performed by: UROLOGY

## 2021-11-18 RX ORDER — SULFAMETHOXAZOLE AND TRIMETHOPRIM 800; 160 MG/1; MG/1
1 TABLET ORAL EVERY 12 HOURS SCHEDULED
Qty: 4 TABLET | Refills: 0 | Status: SHIPPED | OUTPATIENT
Start: 2021-11-18 | End: 2021-11-20

## 2021-11-18 RX ORDER — OXYBUTYNIN CHLORIDE 5 MG/1
TABLET ORAL
Qty: 180 TABLET | Refills: 1 | Status: SHIPPED | OUTPATIENT
Start: 2021-11-18

## 2022-02-24 ENCOUNTER — TELEPHONE (OUTPATIENT)
Dept: UROLOGY | Facility: CLINIC | Age: 77
End: 2022-02-24

## 2022-02-24 NOTE — TELEPHONE ENCOUNTER
Spoke with patient in regards to his appointment with Dr River Gonzalez 2/25/22 at 9:45 needing to be cancelled due to the impending weather, Per [facility] office will contact patient with new appointment

## 2024-07-17 ENCOUNTER — TELEPHONE (OUTPATIENT)
Age: 79
End: 2024-07-17

## 2024-07-17 NOTE — TELEPHONE ENCOUNTER
Pt called to schedule f/u appt with Dr. Barth.  Scheduled pt 11/22/24, next available.  Also placed pt on wait list.  Pt last seen Dr. Barth 11/18/2021.  Pt sts that he is having Nocturia.  Pt sts that he gets up at least 4 times a night to urinate.  Pt also sts he is having urinary urgency.  Please review for appropriate scheduling.    Pt call back: [phone]

## 2024-11-13 ENCOUNTER — RA CDI HCC (OUTPATIENT)
Dept: OTHER | Facility: HOSPITAL | Age: 79
End: 2024-11-13

## 2024-11-22 ENCOUNTER — OFFICE VISIT (OUTPATIENT)
Dept: UROLOGY | Facility: MEDICAL CENTER | Age: 79
End: 2024-11-22
Payer: COMMERCIAL

## 2024-11-22 VITALS
HEIGHT: 65 IN | SYSTOLIC BLOOD PRESSURE: 110 MMHG | OXYGEN SATURATION: 92 % | WEIGHT: 126 LBS | DIASTOLIC BLOOD PRESSURE: 80 MMHG | BODY MASS INDEX: 20.99 KG/M2

## 2024-11-22 DIAGNOSIS — N32.81 OVERACTIVE BLADDER: ICD-10-CM

## 2024-11-22 DIAGNOSIS — N40.1 BPH WITH LOWER URINARY TRACT SYMPTOMS WITHOUT URINARY OBSTRUCTION: Primary | ICD-10-CM

## 2024-11-22 DIAGNOSIS — R39.15 URINARY URGENCY: ICD-10-CM

## 2024-11-22 PROCEDURE — 99214 OFFICE O/P EST MOD 30 MIN: CPT | Performed by: UROLOGY

## 2024-11-22 RX ORDER — FINASTERIDE 5 MG/1
5 TABLET, FILM COATED ORAL DAILY
COMMUNITY
Start: 2024-10-30

## 2024-11-22 NOTE — LETTER
November 22, 2024     Tylor Berry Jr., DO  [address] [facility] [address]    Patient: Robert Pate   YOB: 1945   Date of Visit: 11/22/2024       Dear Dr. Berry:    Thank you for referring Robert Pate to me for evaluation. Below are my notes for this consultation.    If you have questions, please do not hesitate to call me. I look forward to following your patient along with you.         Sincerely,        Igor Barth MD        CC: No Recipients

## 2024-11-22 NOTE — LETTER
2024     Tylor Berry Jr., DO  [address] [facility] [address]    Patient: Robert Pate   YOB: 1945   Date of Visit: 2024       Dear Dr. Berry:    Thank you for referring Robetr Pate to me for evaluation. Below are my notes for this consultation.    If you have questions, please do not hesitate to call me. I look forward to following your patient along with you.         Sincerely,        Igor Barth MD        CC: No Recipients    Igor Barth MD  2024  2:17 PM  Sign when Signing Visit  Name: Robert Pate      : 1945      MRN: [id number]  Encounter Provider: Igor Barth MD  Encounter Date: 2024   Encounter [facility]  :  Assessment & Plan  BPH with lower urinary tract symptoms without urinary obstruction  AUA symptom score 16 with patient noting that the positive effects from UroLift have waned.  Weakening urinary stream urgency and frequency with nocturia 4-5 times a night noted.  Plan cystoscopy PVR urinalysis and probable TURP       Overactive bladder  As above, anticholinergics did not help       Urinary urgency  As above           History of Present Illness  Robert Pate is a 79 y.o. male who presents 79-year-old gentleman with obstructive voiding symptoms underwent UroLift x 6 number of years ago.  The patient's AUA symptom score came down to 11 however the patient had continued to take tamsulosin.  The patient now presents noting an AUA symptom score of 16 with weakening of the urinary stream significant nocturia frequency and urgency.  He presents for evaluation as to whether further prostate surgery or other interventions would be necessary he denies gross hematuria and incontinence  AUA SYMPTOM SCORE      Flowsheet Row Most Recent Value   AUA SYMPTOM SCORE    How often have you had a sensation of not emptying your bladder completely after you finished urinating? 1 (P)      How often have you had to urinate again less than two hours after you finished urinating? 2 (P)     How often have you found you stopped and started again several times when you urinate? 1 (P)     How often have you found it difficult to postpone urination? 4 (P)     How often have you had a weak urinary stream? 3 (P)     How often have you had to push or strain to begin urination? 0 (P)     How many times did you most typically get up to urinate from the time you went to bed at night until the time you got up in the morning? 5 (P)     Quality of Life: If you were to spend the rest of your life with your urinary condition just the way it is now, how would you feel about that? 1 (P)     AUA SYMPTOM SCORE 16 (P)            Review of Systems   Genitourinary:  Positive for frequency and urgency.   All other systems reviewed and are negative.    Pertinent Medical History          Medical History Reviewed by provider this encounter:  Tobacco  Allergies  Meds  Problems  Med Hx  Surg Hx  Fam Hx  Soc   Hx    .  Past Medical History  Past Medical History:   Diagnosis Date   • Anemia     iron def, long history of anemia no meds   • Anxiety    • BPH (benign prostatic hyperplasia)    • Erectile dysfunction    • Hypertension      Past Surgical History:   Procedure Laterality Date   • CATARACT EXTRACTION Bilateral    • COLONOSCOPY     • ID CYSTO INSERTION TRANSPROSTATIC IMPLANT SINGLE N/A 8/7/2020    Procedure: CYSTOSCOPY WITH INSERTION UROLIFT;  Surgeon: Igor Barth MD;  Location: [facility];  Service: Urology   • RECTAL SURGERY       Family History   Problem Relation Age of Onset   • No Known Problems Mother    • No Known Problems Father       reports that he quit smoking about 32 years ago. He has never used smokeless tobacco. He reports current alcohol use. He reports that he does not use drugs.  Current Outpatient Medications on File Prior to Visit   Medication Sig Dispense Refill   • losartan (COZAAR) 100 MG  tablet      • metoprolol succinate (TOPROL-XL) 50 mg 24 hr tablet      • tadalafil (CIALIS) 20 MG tablet Take 1 tablet (20 mg total) by mouth as needed for erectile dysfunction 30 tablet 11   • tadalafil (CIALIS) 5 MG tablet Take 1 tablet (5 mg total) by mouth daily as needed for erectile dysfunction 90 tablet 3   • tamsulosin (FLOMAX) 0.4 mg Take 1 capsule (0.4 mg total) by mouth daily with dinner 90 capsule 3   • venlafaxine (EFFEXOR-XR) 150 mg 24 hr capsule Take 150 mg by mouth daily      • metoprolol tartrate (LOPRESSOR) 25 mg tablet      • [DISCONTINUED] oxybutynin (DITROPAN) 5 mg tablet 1 Tablet q.h.s. for nighttime urination.  If little or no improvement increase to 2 tablets p.o. q.h.s. 180 tablet 1     No current facility-administered medications on file prior to visit.     Allergies   Allergen Reactions   • Lisinopril Cough      Current Outpatient Medications on File Prior to Visit   Medication Sig Dispense Refill   • losartan (COZAAR) 100 MG tablet      • metoprolol succinate (TOPROL-XL) 50 mg 24 hr tablet      • tadalafil (CIALIS) 20 MG tablet Take 1 tablet (20 mg total) by mouth as needed for erectile dysfunction 30 tablet 11   • tadalafil (CIALIS) 5 MG tablet Take 1 tablet (5 mg total) by mouth daily as needed for erectile dysfunction 90 tablet 3   • tamsulosin (FLOMAX) 0.4 mg Take 1 capsule (0.4 mg total) by mouth daily with dinner 90 capsule 3   • venlafaxine (EFFEXOR-XR) 150 mg 24 hr capsule Take 150 mg by mouth daily      • metoprolol tartrate (LOPRESSOR) 25 mg tablet      • [DISCONTINUED] oxybutynin (DITROPAN) 5 mg tablet 1 Tablet q.h.s. for nighttime urination.  If little or no improvement increase to 2 tablets p.o. q.h.s. 180 tablet 1     No current facility-administered medications on file prior to visit.      Social History     Tobacco Use   • Smoking status: Former     Current packs/day: 0.00     Types: Cigarettes     Quit date: 8/3/1992     Years since quittin.3   • Smokeless tobacco:  "Never   Vaping Use   • Vaping status: Never Used   Substance and Sexual Activity   • Alcohol use: Yes     Comment: wine with dinner   • Drug use: Never   • Sexual activity: Not on file        Objective  /80 (BP Location: Left arm, Patient Position: Sitting, Cuff Size: Standard)   Ht 5' 5\" (1.651 m)   Wt 57.2 kg (126 lb)   SpO2 92%   BMI 20.97 kg/m²     Physical Exam  Vitals reviewed.   Constitutional:       General: He is not in acute distress.     Appearance: Normal appearance. He is normal weight. He is not ill-appearing, toxic-appearing or diaphoretic.   HENT:      Head: Normocephalic and atraumatic.      Nose: Nose normal.      Mouth/Throat:      Mouth: Mucous membranes are moist.   Eyes:      Extraocular Movements: Extraocular movements intact.   Pulmonary:      Effort: Pulmonary effort is normal. No respiratory distress.   Abdominal:      General: Bowel sounds are normal. There is no distension.      Palpations: Abdomen is soft.      Tenderness: There is no abdominal tenderness. There is no guarding or rebound.   Genitourinary:     Penis: Normal.       Testes: Normal.   Musculoskeletal:      Cervical back: Neck supple.   Skin:     General: Skin is dry.   Neurological:      Mental Status: He is alert and oriented to person, place, and time.   Psychiatric:         Mood and Affect: Mood normal.         Behavior: Behavior normal.         Thought Content: Thought content normal.         Judgment: Judgment normal.           Results  Lab Results   Component Value Date    PSA 1.88 02/28/2020     Lab Results   Component Value Date    CALCIUM 9.3 06/11/2024    K 4.9 06/11/2024    CO2 24 06/11/2024     06/11/2024    BUN 27 06/11/2024    CREATININE 1.02 06/11/2024     Lab Results   Component Value Date    WBC 8.52 10/02/2020    HGB 9.8 (L) 10/02/2020    HCT 30.4 (L) 10/02/2020    MCV 98 10/02/2020     10/02/2020       Office Urine Dip  No results found for this or any previous visit (from the past " hour).]

## (undated) DEVICE — CATH FOLEY 20FR 30ML 2 WAY SILICONE-ELASTIMER

## (undated) DEVICE — CATH SECURE FOLEY

## (undated) DEVICE — SCD SEQUENTIAL COMPRESSION COMFORT SLEEVE MEDIUM KNEE LENGTH: Brand: KENDALL SCD

## (undated) DEVICE — PACK TUR

## (undated) DEVICE — STERILE POLYISOPRENE POWDER-FREE SURGICAL GLOVES: Brand: PROTEXIS

## (undated) DEVICE — UROCATCH BAG

## (undated) DEVICE — TUBING SUCTION 5MM X 12 FT

## (undated) DEVICE — BAG URINE DRAINAGE 2000ML ANTI RFLX LF

## (undated) DEVICE — INVIEW CLEAR LEGGINGS: Brand: CONVERTORS